# Patient Record
Sex: FEMALE | Race: WHITE | NOT HISPANIC OR LATINO | Employment: OTHER | URBAN - METROPOLITAN AREA
[De-identification: names, ages, dates, MRNs, and addresses within clinical notes are randomized per-mention and may not be internally consistent; named-entity substitution may affect disease eponyms.]

---

## 2020-01-08 ENCOUNTER — TELEPHONE (OUTPATIENT)
Dept: FAMILY MEDICINE | Facility: CLINIC | Age: 70
End: 2020-01-08

## 2020-01-08 DIAGNOSIS — Z79.899 ENCOUNTER FOR LONG-TERM (CURRENT) USE OF OTHER MEDICATIONS: ICD-10-CM

## 2020-01-08 DIAGNOSIS — Z00.00 ROUTINE GENERAL MEDICAL EXAMINATION AT A HEALTH CARE FACILITY: Primary | ICD-10-CM

## 2020-01-08 DIAGNOSIS — E78.00 PURE HYPERCHOLESTEROLEMIA: ICD-10-CM

## 2020-01-08 DIAGNOSIS — I10 BENIGN ESSENTIAL HTN: ICD-10-CM

## 2020-01-08 DIAGNOSIS — E11.9 CONTROLLED TYPE 2 DIABETES MELLITUS WITHOUT COMPLICATION, WITHOUT LONG-TERM CURRENT USE OF INSULIN: ICD-10-CM

## 2020-01-08 NOTE — TELEPHONE ENCOUNTER
Tried calling pt to let her know we are putting in labs. Neither number would get to the pt - both were unavailable and no vm box. Same number in [facility]

## 2020-01-16 DIAGNOSIS — M54.2 CERVICALGIA: Primary | ICD-10-CM

## 2020-01-20 ENCOUNTER — TELEPHONE (OUTPATIENT)
Dept: FAMILY MEDICINE | Facility: CLINIC | Age: 70
End: 2020-01-20

## 2020-01-20 NOTE — TELEPHONE ENCOUNTER
----- Message from Pao Orozco sent at 1/20/2020  9:21 AM CST -----  Contact: Cora Larry  Pt says she isn't coming in tomorrow because her 's appt was dropped for tomorrow when the systems changed. She says that her  should have been scheduled tomorrow for 3pm like it was on their appt card that she received at her last appt in July. She would like to just reschedule for February for an evening appt if possible, when her and her  can both see dr. Fiore at the same time.   Pt# [phone]

## 2020-01-22 ENCOUNTER — HOSPITAL ENCOUNTER (OUTPATIENT)
Dept: RADIOLOGY | Facility: HOSPITAL | Age: 70
Discharge: HOME | End: 2020-01-22
Attending: NEUROLOGICAL SURGERY
Payer: MEDICARE

## 2020-01-22 DIAGNOSIS — M54.2 CERVICALGIA: ICD-10-CM

## 2020-01-22 PROCEDURE — 72141 MRI NECK SPINE W/O DYE: CPT | Mod: TC,PO

## 2020-02-03 RX ORDER — ALPRAZOLAM 2 MG/1
2 TABLET ORAL DAILY
COMMUNITY
Start: 2019-11-15 | End: 2020-02-05

## 2020-02-03 RX ORDER — ZOLPIDEM TARTRATE 10 MG/1
10 TABLET ORAL NIGHTLY PRN
COMMUNITY
Start: 2019-11-14

## 2020-02-05 ENCOUNTER — OFFICE VISIT (OUTPATIENT)
Dept: FAMILY MEDICINE | Facility: CLINIC | Age: 70
End: 2020-02-05
Payer: MEDICARE

## 2020-02-05 VITALS
HEART RATE: 60 BPM | WEIGHT: 183 LBS | SYSTOLIC BLOOD PRESSURE: 126 MMHG | DIASTOLIC BLOOD PRESSURE: 78 MMHG | BODY MASS INDEX: 34.58 KG/M2

## 2020-02-05 DIAGNOSIS — J30.1 SEASONAL ALLERGIC RHINITIS DUE TO POLLEN: ICD-10-CM

## 2020-02-05 DIAGNOSIS — M47.817 LUMBOSACRAL SPONDYLOSIS WITHOUT MYELOPATHY: ICD-10-CM

## 2020-02-05 DIAGNOSIS — Z12.31 OTHER SCREENING MAMMOGRAM: ICD-10-CM

## 2020-02-05 DIAGNOSIS — K21.9 GASTROESOPHAGEAL REFLUX DISEASE WITHOUT ESOPHAGITIS: ICD-10-CM

## 2020-02-05 DIAGNOSIS — E03.9 ACQUIRED HYPOTHYROIDISM: ICD-10-CM

## 2020-02-05 DIAGNOSIS — I10 ESSENTIAL HYPERTENSION: ICD-10-CM

## 2020-02-05 DIAGNOSIS — M48.02 FORAMINAL STENOSIS OF CERVICAL REGION: Primary | ICD-10-CM

## 2020-02-05 DIAGNOSIS — E11.9 TYPE 2 DIABETES MELLITUS WITHOUT COMPLICATION, WITHOUT LONG-TERM CURRENT USE OF INSULIN: ICD-10-CM

## 2020-02-05 DIAGNOSIS — Z78.0 MENOPAUSE: ICD-10-CM

## 2020-02-05 PROCEDURE — 3078F DIAST BP <80 MM HG: CPT | Mod: S$GLB,,, | Performed by: FAMILY MEDICINE

## 2020-02-05 PROCEDURE — 1159F PR MEDICATION LIST DOCUMENTED IN MEDICAL RECORD: ICD-10-PCS | Mod: S$GLB,,, | Performed by: FAMILY MEDICINE

## 2020-02-05 PROCEDURE — 3078F PR MOST RECENT DIASTOLIC BLOOD PRESSURE < 80 MM HG: ICD-10-PCS | Mod: S$GLB,,, | Performed by: FAMILY MEDICINE

## 2020-02-05 PROCEDURE — 99214 PR OFFICE/OUTPT VISIT, EST, LEVL IV, 30-39 MIN: ICD-10-PCS | Mod: S$GLB,,, | Performed by: FAMILY MEDICINE

## 2020-02-05 PROCEDURE — 3074F PR MOST RECENT SYSTOLIC BLOOD PRESSURE < 130 MM HG: ICD-10-PCS | Mod: S$GLB,,, | Performed by: FAMILY MEDICINE

## 2020-02-05 PROCEDURE — 99214 OFFICE O/P EST MOD 30 MIN: CPT | Mod: S$GLB,,, | Performed by: FAMILY MEDICINE

## 2020-02-05 PROCEDURE — 1159F MED LIST DOCD IN RCRD: CPT | Mod: S$GLB,,, | Performed by: FAMILY MEDICINE

## 2020-02-05 PROCEDURE — 3074F SYST BP LT 130 MM HG: CPT | Mod: S$GLB,,, | Performed by: FAMILY MEDICINE

## 2020-02-05 RX ORDER — LIDOCAINE 50 MG/G
1 PATCH TOPICAL DAILY
Qty: 5 PATCH | Refills: 0 | Status: SHIPPED | OUTPATIENT
Start: 2020-02-05 | End: 2021-09-21

## 2020-02-05 NOTE — PROGRESS NOTES
SUBJECTIVE:    Patient ID: Cora Larry is a 69 y.o. female.    Chief Complaint: Follow-up (did not bring bottles. refused nv, pts list and our med list do not match up // refused prevnar )    This 69-year-old female comes in for six-month checkup.  She has had some cervical foraminal stenosis discovered on MRI.  She had left neck pain that did not respond to conservative measures and NSAIDs.  She has been going to physical therapy and has had good relief of this neck pain. She saw Dr. Tom Pedraza neurosurgeon about this.    She is due for mammogram and a DEXA scan this year.  She is willing to do a stool for blood instead of colonoscopy.      No visits with results within 6 Month(s) from this visit.   Latest known visit with results is:   Admission on 12/12/2013, Discharged on 12/12/2013   Component Date Value Ref Range Status    POCT Glucose 12/12/2013 115* 70 - 110 mg/dL Final       Past Medical History:   Diagnosis Date    Arthritis     Back pain     Depression     Diabetes mellitus     Hypercholesteremia     Seasonal allergies      Past Surgical History:   Procedure Laterality Date    CHOLECYSTECTOMY      lucero      dec 2011     History reviewed. No pertinent family history.    Marital Status:   Alcohol History:  reports that she drinks about 1.0 standard drinks of alcohol per week.  Tobacco History:  reports that she has been smoking. She has a 11.75 pack-year smoking history. She does not have any smokeless tobacco history on file.  Drug History:  has no drug history on file.    Review of patient's allergies indicates:   Allergen Reactions    Lorabid [loracarbef] Hives    Sulfa (sulfonamide antibiotics) Hives       Current Outpatient Medications:     alprazolam (XANAX) 1 MG tablet, Take 1 mg by mouth nightly as needed.  , Disp: , Rfl:     ascorbic acid (VITAMIN C) 100 MG tablet, Take 100 mg by mouth once daily.  , Disp: , Rfl:     ascorbic acid (VITAMIN C) 1000 MG tablet, Take  1,000 mg by mouth once daily.  , Disp: , Rfl:     aspirin (ECOTRIN) 81 MG EC tablet, Take 81 mg by mouth once daily.  , Disp: , Rfl:     calcium carbonate 1250 MG capsule, Take 1,250 mg by mouth 2 (two) times daily with meals.  , Disp: , Rfl:     metformin (GLUMETZA) 500 MG (MOD) 24 hr tablet, Take 500 mg by mouth daily with breakfast., Disp: , Rfl:     rosuvastatin (CRESTOR) 10 MG tablet, Take 10 mg by mouth once daily.  , Disp: , Rfl:     tramadol (ULTRAM) 50 mg tablet, Take 50 mg by mouth 2 (two) times daily., Disp: , Rfl:     zolpidem (AMBIEN) 10 mg Tab, Take 10 mg by mouth nightly as needed., Disp: , Rfl:   No current facility-administered medications for this visit.     Facility-Administered Medications Ordered in Other Visits:     betamethasone acetate-betamethasone sodium phosphate injection, , , PRN, Ruddy Serrano MD, 3 mL at 04/12/12 1002    bupivacaine (PF) 0.25 % (2.5 mg/mL) injection, , , PRN, Ruddy Serrano MD, 3 mL at 04/12/12 1002    iopamidol 61 % intrathecal injection, , , PRN, Ruddy Serrano MD, 3 mL at 04/12/12 1001    lidocaine (PF) 10 mg/mL (1 %) injection, , , PRN, Ruddy Serrano MD, 10 mL at 04/12/12 0959    Review of Systems   Constitutional: Negative for appetite change, chills, fatigue, fever and unexpected weight change.   HENT: Negative for congestion, ear pain, sinus pain, sore throat and trouble swallowing.    Eyes: Negative for pain, discharge and visual disturbance.   Respiratory: Negative for apnea, cough, shortness of breath and wheezing.    Cardiovascular: Negative for chest pain, palpitations and leg swelling.   Gastrointestinal: Negative for abdominal pain, blood in stool, constipation, diarrhea, nausea and vomiting.        Ranitidine  Helps the  gastritis   Endocrine: Negative for heat intolerance, polydipsia and polyuria.   Genitourinary: Negative for difficulty urinating, dyspareunia, dysuria, frequency, hematuria and menstrual problem.    Musculoskeletal: Positive for back pain (mild back pains, tylenol pms at  night) and neck stiffness (goes to phys therapy, tractions and  TNS unit). Negative for arthralgias, gait problem, joint swelling and myalgias.   Allergic/Immunologic: Negative for environmental allergies, food allergies and immunocompromised state.   Neurological: Negative for dizziness, tremors, seizures, numbness and headaches.   Psychiatric/Behavioral: Negative for behavioral problems, confusion, hallucinations and suicidal ideas. The patient is not nervous/anxious.           Objective:      Vitals:    02/05/20 1558   BP: 126/78   Pulse: 60   Weight: 83 kg (183 lb)     Body mass index is 34.58 kg/m².  Physical Exam   Constitutional: She is oriented to person, place, and time. She appears well-developed and well-nourished.   Mildly obese female in no apparent distress   HENT:   Head: Normocephalic and atraumatic.   Right Ear: External ear normal.   Left Ear: External ear normal.   Nose: Nose normal.   Mouth/Throat: Oropharynx is clear and moist.   Eyes: Pupils are equal, round, and reactive to light. EOM are normal.   Neck: Normal range of motion. Neck supple. Carotid bruit is not present. No thyromegaly present.   Neck rotates 80° bilaterally flexion and extension are normal.   Cardiovascular: Normal rate, regular rhythm, normal heart sounds and intact distal pulses.   No murmur heard.  Pulmonary/Chest: Effort normal and breath sounds normal. She has no wheezes. She has no rales.   Abdominal: Soft. Bowel sounds are normal. She exhibits no distension. There is no hepatosplenomegaly. There is no tenderness.   Musculoskeletal: Normal range of motion. She exhibits no tenderness or deformity.        Lumbar back: Normal. She exhibits no pain and no spasm.   Bends 90 degrees at  waist shoulders have full range of motion, knees have full range of motion.  She walks with a normal gait   Feet:   Right Foot:   Protective Sensation: 5 sites tested.  5 sites sensed.   Skin Integrity: Negative for callus.   Left Foot:   Protective Sensation: 5 sites tested. 5 sites sensed.   Skin Integrity: Negative for callus.   Lymphadenopathy:     She has no cervical adenopathy.   Neurological: She is alert and oriented to person, place, and time. No cranial nerve deficit. Coordination normal.   Skin: Skin is warm and dry. No rash noted.   Psychiatric: She has a normal mood and affect. Her behavior is normal. Judgment and thought content normal.   Nursing note and vitals reviewed.        Assessment:       1. Foraminal stenosis of cervical region    2. Essential hypertension    3. Acquired hypothyroidism    4. Type 2 diabetes mellitus without complication, without long-term current use of insulin    5. Seasonal allergic rhinitis due to pollen    6. Lumbosacral spondylosis without myelopathy    7. Gastroesophageal reflux disease without esophagitis         Plan:       Foraminal stenosis of cervical region  Continue physical therapy as needed for pain relief.  Lidocaine 5% patch daily p.r.n. pain  Essential hypertension  Blood pressure well controlled with Bystolic and amlodipine  Acquired hypothyroidism  Continue levothyroxine 50 mcg daily  Type 2 diabetes mellitus without complication, without long-term current use of insulin  A1c well controlled.  Continue metformin 500 mg daily  Seasonal allergic rhinitis due to pollen  Continue Zyrtec as needed  Lumbosacral spondylosis without myelopathy    Gastroesophageal reflux disease without esophagitis  Ranitidine 150 mg q.a.m.  She will obtain Dr. Villa recent lab work for us to place in the chart.    No follow-ups on file.

## 2020-02-09 LAB — HEMOCCULT STL QL IA: NORMAL

## 2020-02-12 ENCOUNTER — HOSPITAL ENCOUNTER (OUTPATIENT)
Dept: RADIOLOGY | Facility: HOSPITAL | Age: 70
Discharge: HOME | End: 2020-02-12
Attending: FAMILY MEDICINE
Payer: MEDICARE

## 2020-02-12 VITALS — BODY MASS INDEX: 34.55 KG/M2 | WEIGHT: 183 LBS | HEIGHT: 61 IN

## 2020-02-12 DIAGNOSIS — Z78.0 MENOPAUSE: ICD-10-CM

## 2020-02-12 DIAGNOSIS — Z12.31 OTHER SCREENING MAMMOGRAM: ICD-10-CM

## 2020-02-12 PROCEDURE — 77067 SCR MAMMO BI INCL CAD: CPT | Mod: TC,PO

## 2020-02-17 ENCOUNTER — TELEPHONE (OUTPATIENT)
Dept: FAMILY MEDICINE | Facility: CLINIC | Age: 70
End: 2020-02-17

## 2020-02-17 NOTE — TELEPHONE ENCOUNTER
----- Message from Ruddy Fiore MD sent at 2/15/2020  7:46 PM CST -----  Your stool test was negative.  No blood was seen.

## 2020-02-17 NOTE — TELEPHONE ENCOUNTER
----- Message from Ruddy Fiore MD sent at 2/15/2020  7:47 PM CST -----  Call patient.  Mammogram was normal.  Repeat mammogram in 1 year.

## 2020-09-01 ENCOUNTER — TELEPHONE (OUTPATIENT)
Dept: FAMILY MEDICINE | Facility: CLINIC | Age: 70
End: 2020-09-01

## 2020-09-17 ENCOUNTER — OFFICE VISIT (OUTPATIENT)
Dept: FAMILY MEDICINE | Facility: CLINIC | Age: 70
End: 2020-09-17
Payer: MEDICARE

## 2020-09-17 VITALS
SYSTOLIC BLOOD PRESSURE: 126 MMHG | HEIGHT: 61 IN | DIASTOLIC BLOOD PRESSURE: 64 MMHG | TEMPERATURE: 97 F | WEIGHT: 185 LBS | HEART RATE: 72 BPM | BODY MASS INDEX: 34.93 KG/M2

## 2020-09-17 DIAGNOSIS — M85.852 OSTEOPENIA OF BOTH HIPS: ICD-10-CM

## 2020-09-17 DIAGNOSIS — E11.9 TYPE 2 DIABETES MELLITUS WITHOUT COMPLICATION, WITHOUT LONG-TERM CURRENT USE OF INSULIN: Primary | ICD-10-CM

## 2020-09-17 DIAGNOSIS — H61.23 BILATERAL IMPACTED CERUMEN: ICD-10-CM

## 2020-09-17 DIAGNOSIS — Z23 NEED FOR INFLUENZA VACCINATION: ICD-10-CM

## 2020-09-17 DIAGNOSIS — M75.42 ROTATOR CUFF IMPINGEMENT SYNDROME OF LEFT SHOULDER: ICD-10-CM

## 2020-09-17 DIAGNOSIS — J30.1 SEASONAL ALLERGIC RHINITIS DUE TO POLLEN: ICD-10-CM

## 2020-09-17 DIAGNOSIS — E03.9 ACQUIRED HYPOTHYROIDISM: ICD-10-CM

## 2020-09-17 DIAGNOSIS — K21.9 GASTROESOPHAGEAL REFLUX DISEASE WITHOUT ESOPHAGITIS: ICD-10-CM

## 2020-09-17 DIAGNOSIS — Z23 NEED FOR VACCINATION AGAINST STREPTOCOCCUS PNEUMONIAE: ICD-10-CM

## 2020-09-17 DIAGNOSIS — M48.02 FORAMINAL STENOSIS OF CERVICAL REGION: ICD-10-CM

## 2020-09-17 DIAGNOSIS — M85.851 OSTEOPENIA OF BOTH HIPS: ICD-10-CM

## 2020-09-17 DIAGNOSIS — I10 ESSENTIAL HYPERTENSION: ICD-10-CM

## 2020-09-17 PROCEDURE — 1159F PR MEDICATION LIST DOCUMENTED IN MEDICAL RECORD: ICD-10-PCS | Mod: S$GLB,,, | Performed by: FAMILY MEDICINE

## 2020-09-17 PROCEDURE — 69209 REMOVE IMPACTED EAR WAX UNI: CPT | Mod: 50,S$GLB,, | Performed by: FAMILY MEDICINE

## 2020-09-17 PROCEDURE — G0008 ADMIN INFLUENZA VIRUS VAC: HCPCS | Mod: S$GLB,,, | Performed by: FAMILY MEDICINE

## 2020-09-17 PROCEDURE — 3078F DIAST BP <80 MM HG: CPT | Mod: S$GLB,,, | Performed by: FAMILY MEDICINE

## 2020-09-17 PROCEDURE — G0009 ADMIN PNEUMOCOCCAL VACCINE: HCPCS | Mod: S$GLB,,, | Performed by: FAMILY MEDICINE

## 2020-09-17 PROCEDURE — 1159F MED LIST DOCD IN RCRD: CPT | Mod: S$GLB,,, | Performed by: FAMILY MEDICINE

## 2020-09-17 PROCEDURE — G0008 FLU VACCINE - QUADRIVALENT - HIGH DOSE (65+) PRESERVATIVE FREE IM: ICD-10-PCS | Mod: S$GLB,,, | Performed by: FAMILY MEDICINE

## 2020-09-17 PROCEDURE — 99214 PR OFFICE/OUTPT VISIT, EST, LEVL IV, 30-39 MIN: ICD-10-PCS | Mod: 25,S$GLB,, | Performed by: FAMILY MEDICINE

## 2020-09-17 PROCEDURE — 99214 OFFICE O/P EST MOD 30 MIN: CPT | Mod: 25,S$GLB,, | Performed by: FAMILY MEDICINE

## 2020-09-17 PROCEDURE — 69209 EAR CERUMEN REMOVAL: ICD-10-PCS | Mod: 50,S$GLB,, | Performed by: FAMILY MEDICINE

## 2020-09-17 PROCEDURE — G0009 PNEUMOCOCCAL CONJUGATE VACCINE 13-VALENT LESS THAN 5YO & GREATER THAN: ICD-10-PCS | Mod: S$GLB,,, | Performed by: FAMILY MEDICINE

## 2020-09-17 PROCEDURE — 3074F PR MOST RECENT SYSTOLIC BLOOD PRESSURE < 130 MM HG: ICD-10-PCS | Mod: S$GLB,,, | Performed by: FAMILY MEDICINE

## 2020-09-17 PROCEDURE — 90662 FLU VACCINE - QUADRIVALENT - HIGH DOSE (65+) PRESERVATIVE FREE IM: ICD-10-PCS | Mod: S$GLB,,, | Performed by: FAMILY MEDICINE

## 2020-09-17 PROCEDURE — 90670 PCV13 VACCINE IM: CPT | Mod: S$GLB,,, | Performed by: FAMILY MEDICINE

## 2020-09-17 PROCEDURE — 3008F BODY MASS INDEX DOCD: CPT | Mod: S$GLB,,, | Performed by: FAMILY MEDICINE

## 2020-09-17 PROCEDURE — 90662 IIV NO PRSV INCREASED AG IM: CPT | Mod: S$GLB,,, | Performed by: FAMILY MEDICINE

## 2020-09-17 PROCEDURE — 3078F PR MOST RECENT DIASTOLIC BLOOD PRESSURE < 80 MM HG: ICD-10-PCS | Mod: S$GLB,,, | Performed by: FAMILY MEDICINE

## 2020-09-17 PROCEDURE — 90670 PNEUMOCOCCAL CONJUGATE VACCINE 13-VALENT LESS THAN 5YO & GREATER THAN: ICD-10-PCS | Mod: S$GLB,,, | Performed by: FAMILY MEDICINE

## 2020-09-17 PROCEDURE — 3074F SYST BP LT 130 MM HG: CPT | Mod: S$GLB,,, | Performed by: FAMILY MEDICINE

## 2020-09-17 PROCEDURE — 3008F PR BODY MASS INDEX (BMI) DOCUMENTED: ICD-10-PCS | Mod: S$GLB,,, | Performed by: FAMILY MEDICINE

## 2020-09-17 RX ORDER — METFORMIN HYDROCHLORIDE 500 MG/1
1000 TABLET ORAL 2 TIMES DAILY WITH MEALS
COMMUNITY
Start: 2020-07-17

## 2020-09-17 RX ORDER — NEBIVOLOL HYDROCHLORIDE 5 MG/1
1 TABLET ORAL DAILY
COMMUNITY
Start: 2020-09-14

## 2020-09-17 RX ORDER — AMLODIPINE BESYLATE 5 MG/1
1 TABLET ORAL DAILY
COMMUNITY
Start: 2020-09-14

## 2020-09-17 RX ORDER — CANAGLIFLOZIN 100 MG/1
100 TABLET, FILM COATED ORAL DAILY
Qty: 30 TABLET | Refills: 2
Start: 2020-09-17 | End: 2021-09-21

## 2020-09-17 RX ORDER — LEVOTHYROXINE SODIUM 50 UG/1
1 TABLET ORAL DAILY
COMMUNITY
Start: 2020-08-03 | End: 2022-03-24

## 2020-09-17 NOTE — PROCEDURES
"Ear Cerumen Removal    Date/Time: 9/17/2020 2:40 PM  Performed by: Ruddy Fiore MD  Authorized by: Ruddy Fiore MD     Time out: Immediately prior to procedure a "time out" was called to verify the correct patient, procedure, equipment, support staff and site/side marked as required.    Consent Done?:  Yes (Verbal)    Local anesthetic:  None  Location details:  Both ears  Procedure type: irrigation    Patient tolerance:  Patient tolerated the procedure well with no immediate complications      "

## 2020-09-17 NOTE — PROGRESS NOTES
Subjective:       Patient ID: Cora Larry is a 69 y.o. female.    Chief Complaint: Follow-up (did not bring bottles, brought a list // dexa - due // agrees to flu and pna ac)    69 year old female arrives today for 6 month follow up.  She has been having left shoulder pain since the start of the pandemic.  She pain is worse in the morning when she sleeps on that shoulder.  She also is having ear fullness and feels as though her ears are full of wax.  She is currently taking zyrtec daily for her allergies and it is working fine.  She wakes up with some congestion and sneezing.      She has been trouble with her GERD.  She takes her Prilosec daily which is working well and she takes the Tums as needed.     Patient exclaims that she has been eating more since the pandemic.  She has been eating more sweets.     She previously had neck pain which was resolved with physical therapy.     Plans to receive influenza and pneumococcal vaccine in clinic today.     Review of patient's allergies indicates:   Allergen Reactions    Lorabid [loracarbef] Hives    Sulfa (sulfonamide antibiotics) Hives         Current Outpatient Medications:     alprazolam (XANAX) 1 MG tablet, Take 1 mg by mouth nightly as needed.  , Disp: , Rfl:     amLODIPine (NORVASC) 5 MG tablet, Take 1 tablet by mouth once daily., Disp: , Rfl:     ascorbic acid (VITAMIN C) 100 MG tablet, Take 100 mg by mouth once daily.  , Disp: , Rfl:     ascorbic acid (VITAMIN C) 1000 MG tablet, Take 1,000 mg by mouth once daily.  , Disp: , Rfl:     aspirin (ECOTRIN) 81 MG EC tablet, Take 81 mg by mouth once daily.  , Disp: , Rfl:     BYSTOLIC 5 mg Tab, Take 1 tablet by mouth once daily., Disp: , Rfl:     calcium carbonate 1250 MG capsule, Take 1,250 mg by mouth 2 (two) times daily with meals.  , Disp: , Rfl:     canagliflozin (INVOKANA) 100 mg Tab tablet, Take 1 tablet (100 mg total) by mouth once daily., Disp: 30 tablet, Rfl: 2    lidocaine (LIDODERM) 5 %,  Place 1 patch onto the skin once daily. Remove & Discard patch within 12 hours or as directed by MD, Disp: 5 patch, Rfl: 0    metFORMIN (GLUCOPHAGE) 500 MG tablet, , Disp: , Rfl:     ranitidine (ZANTAC) 150 MG tablet, Take 1 tablet (150 mg total) by mouth once daily., Disp: 90 tablet, Rfl: 1    rosuvastatin (CRESTOR) 10 MG tablet, Take 10 mg by mouth once daily.  , Disp: , Rfl:     SYNTHROID 50 mcg tablet, Take 1 tablet by mouth once daily., Disp: , Rfl:     tramadol (ULTRAM) 50 mg tablet, Take 50 mg by mouth 2 (two) times daily., Disp: , Rfl:     zolpidem (AMBIEN) 10 mg Tab, Take 10 mg by mouth nightly as needed., Disp: , Rfl:   No current facility-administered medications for this visit.     Facility-Administered Medications Ordered in Other Visits:     betamethasone acetate-betamethasone sodium phosphate injection, , , PRN, Ruddy Serrano MD, 3 mL at 04/12/12 1002    bupivacaine (PF) 0.25 % (2.5 mg/mL) injection, , , PRN, Ruddy Serrano MD, 3 mL at 04/12/12 1002    iopamidol 61 % intrathecal injection, , , PRN, Ruddy Serrano MD, 3 mL at 04/12/12 1001    lidocaine (PF) 10 mg/mL (1 %) injection, , , PRN, Ruddy Serrano MD, 10 mL at 04/12/12 0959    No results found for: WBC, HGB, HCT, PLT, CHOL, TRIG, HDL, LDLDIRECT, ALT, AST, NA, K, CL, CREATININE, BUN, CO2, TSH, PSA, INR, GLUF, HGBA1C, MICROALBUR    Review of Systems   Constitutional: Positive for appetite change. Negative for activity change, fatigue and fever.   HENT: Positive for congestion (morning). Negative for ear discharge, ear pain, postnasal drip and sinus pain.    Eyes: Negative for pain, discharge and itching.   Respiratory: Positive for cough (night). Negative for chest tightness and shortness of breath.    Cardiovascular: Negative for chest pain, palpitations and leg swelling.   Gastrointestinal: Negative for abdominal pain, blood in stool, constipation, diarrhea, nausea and vomiting.   Endocrine: Negative for cold  intolerance and heat intolerance.   Genitourinary: Negative for difficulty urinating, dysuria, hematuria and urgency.        Nocturia(1x per night)     Musculoskeletal: Positive for arthralgias (lt shoulder). Negative for back pain, neck pain and neck stiffness.   Skin: Negative for color change, pallor, rash and wound.   Neurological: Negative for dizziness, weakness, light-headedness and numbness.   Hematological: Does not bruise/bleed easily.   Psychiatric/Behavioral: Negative for confusion and sleep disturbance.       Objective:      Physical Exam  Constitutional:       General: She is not in acute distress.     Appearance: Normal appearance.   HENT:      Head: Normocephalic and atraumatic.      Right Ear: There is impacted cerumen.      Left Ear: There is impacted cerumen.   Eyes:      Pupils: Pupils are equal, round, and reactive to light.   Neck:      Musculoskeletal: Normal range of motion and neck supple. No neck rigidity or muscular tenderness.   Cardiovascular:      Rate and Rhythm: Normal rate and regular rhythm.      Pulses: Normal pulses.   Pulmonary:      Effort: Pulmonary effort is normal. No respiratory distress.      Breath sounds: Normal breath sounds. No wheezing.   Abdominal:      General: Abdomen is flat. There is no distension.      Palpations: Abdomen is soft.      Tenderness: There is no abdominal tenderness.   Musculoskeletal: Normal range of motion.         General: No swelling or tenderness.      Right lower leg: No edema.      Left lower leg: No edema.      Comments: Lt shoulder pain; pain with reaching back   Skin:     General: Skin is warm and dry.      Coloration: Skin is not jaundiced or pale.   Neurological:      General: No focal deficit present.      Mental Status: She is alert and oriented to person, place, and time.      Cranial Nerves: No cranial nerve deficit.   Psychiatric:         Mood and Affect: Mood normal.         Behavior: Behavior normal.         Thought Content:  Thought content normal.         Judgment: Judgment normal.         Assessment:       1. Type 2 diabetes mellitus without complication, without long-term current use of insulin    2. Need for vaccination against Streptococcus pneumoniae    3. Need for influenza vaccination    4. Bilateral impacted cerumen    5. Essential hypertension    6. Rotator cuff impingement syndrome of left shoulder    7. Acquired hypothyroidism    8. Gastroesophageal reflux disease without esophagitis    9. Foraminal stenosis of cervical region    10. Seasonal allergic rhinitis due to pollen    11. Osteopenia of both hips        Plan:       1.  Diabetes type 2-slightly worse control but A1c of 7.0 not out of control.  She agrees to cut back on her sweets chocolates and chips, add Invokana 100 mg daily  2.  Flu and Prevnar 13 vaccine today  4.  Cerumen impaction-irrigate bilateral ears      Five hypertension-stable on current meds, followed by Dr. Villa  6.  Left shoulder rotator cuff impingement-shoulder exercises  and and ice packs.   may need orthopedic shoulder injection with cortisone  7.  Hypothyroidism-TSH normal  Eight GERD-omeprazole working well  9.  Foraminal stenosis cervical region-pain has resolved with physical therapy

## 2020-10-01 ENCOUNTER — OFFICE VISIT (OUTPATIENT)
Dept: ORTHOPEDICS | Facility: CLINIC | Age: 70
End: 2020-10-01
Payer: MEDICARE

## 2020-10-01 VITALS
SYSTOLIC BLOOD PRESSURE: 130 MMHG | WEIGHT: 180 LBS | BODY MASS INDEX: 33.99 KG/M2 | DIASTOLIC BLOOD PRESSURE: 78 MMHG | HEART RATE: 80 BPM | HEIGHT: 61 IN

## 2020-10-01 DIAGNOSIS — M75.42 IMPINGEMENT SYNDROME OF SHOULDER, LEFT: Primary | ICD-10-CM

## 2020-10-01 PROCEDURE — 20610 LARGE JOINT ASPIRATION/INJECTION: L SUBACROMIAL BURSA: ICD-10-PCS | Mod: LT,S$GLB,, | Performed by: ORTHOPAEDIC SURGERY

## 2020-10-01 PROCEDURE — 1125F AMNT PAIN NOTED PAIN PRSNT: CPT | Mod: S$GLB,,, | Performed by: ORTHOPAEDIC SURGERY

## 2020-10-01 PROCEDURE — 3078F DIAST BP <80 MM HG: CPT | Mod: S$GLB,,, | Performed by: ORTHOPAEDIC SURGERY

## 2020-10-01 PROCEDURE — 3008F PR BODY MASS INDEX (BMI) DOCUMENTED: ICD-10-PCS | Mod: S$GLB,,, | Performed by: ORTHOPAEDIC SURGERY

## 2020-10-01 PROCEDURE — 3078F PR MOST RECENT DIASTOLIC BLOOD PRESSURE < 80 MM HG: ICD-10-PCS | Mod: S$GLB,,, | Performed by: ORTHOPAEDIC SURGERY

## 2020-10-01 PROCEDURE — 1101F PT FALLS ASSESS-DOCD LE1/YR: CPT | Mod: S$GLB,,, | Performed by: ORTHOPAEDIC SURGERY

## 2020-10-01 PROCEDURE — 99204 PR OFFICE/OUTPT VISIT, NEW, LEVL IV, 45-59 MIN: ICD-10-PCS | Mod: 25,S$GLB,, | Performed by: ORTHOPAEDIC SURGERY

## 2020-10-01 PROCEDURE — 20610 DRAIN/INJ JOINT/BURSA W/O US: CPT | Mod: LT,S$GLB,, | Performed by: ORTHOPAEDIC SURGERY

## 2020-10-01 PROCEDURE — 1125F PR PAIN SEVERITY QUANTIFIED, PAIN PRESENT: ICD-10-PCS | Mod: S$GLB,,, | Performed by: ORTHOPAEDIC SURGERY

## 2020-10-01 PROCEDURE — 3075F PR MOST RECENT SYSTOLIC BLOOD PRESS GE 130-139MM HG: ICD-10-PCS | Mod: S$GLB,,, | Performed by: ORTHOPAEDIC SURGERY

## 2020-10-01 PROCEDURE — 3075F SYST BP GE 130 - 139MM HG: CPT | Mod: S$GLB,,, | Performed by: ORTHOPAEDIC SURGERY

## 2020-10-01 PROCEDURE — 1159F MED LIST DOCD IN RCRD: CPT | Mod: S$GLB,,, | Performed by: ORTHOPAEDIC SURGERY

## 2020-10-01 PROCEDURE — 1101F PR PT FALLS ASSESS DOC 0-1 FALLS W/OUT INJ PAST YR: ICD-10-PCS | Mod: S$GLB,,, | Performed by: ORTHOPAEDIC SURGERY

## 2020-10-01 PROCEDURE — 1159F PR MEDICATION LIST DOCUMENTED IN MEDICAL RECORD: ICD-10-PCS | Mod: S$GLB,,, | Performed by: ORTHOPAEDIC SURGERY

## 2020-10-01 PROCEDURE — 99204 OFFICE O/P NEW MOD 45 MIN: CPT | Mod: 25,S$GLB,, | Performed by: ORTHOPAEDIC SURGERY

## 2020-10-01 PROCEDURE — 3008F BODY MASS INDEX DOCD: CPT | Mod: S$GLB,,, | Performed by: ORTHOPAEDIC SURGERY

## 2020-10-01 RX ORDER — METHYLPREDNISOLONE ACETATE 40 MG/ML
40 INJECTION, SUSPENSION INTRA-ARTICULAR; INTRALESIONAL; INTRAMUSCULAR; SOFT TISSUE
Status: DISCONTINUED | OUTPATIENT
Start: 2020-10-01 | End: 2020-10-01 | Stop reason: HOSPADM

## 2020-10-01 RX ORDER — DAPAGLIFLOZIN 10 MG/1
TABLET, FILM COATED ORAL
COMMUNITY
Start: 2020-09-18 | End: 2021-04-05 | Stop reason: SDUPTHER

## 2020-10-01 RX ADMIN — METHYLPREDNISOLONE ACETATE 40 MG: 40 INJECTION, SUSPENSION INTRA-ARTICULAR; INTRALESIONAL; INTRAMUSCULAR; SOFT TISSUE at 11:10

## 2020-10-01 NOTE — PROGRESS NOTES
[facility]    Subjective:     Chief Complaint:   Chief Complaint   Patient presents with    Left Shoulder - Pain     Left shoulder pain x 2 months, States that her pain is not constant, that it comes and goes with certain movements such as raising her hand above her head and behind her back        Past Medical History:   Diagnosis Date    Arthritis     Back pain     Depression     Diabetes mellitus     Hypercholesteremia     Seasonal allergies        Past Surgical History:   Procedure Laterality Date    CHOLECYSTECTOMY      lucero      dec 2011       Current Outpatient Medications   Medication Sig    alprazolam (XANAX) 1 MG tablet Take 1 mg by mouth nightly as needed.      amLODIPine (NORVASC) 5 MG tablet Take 1 tablet by mouth once daily.    aspirin (ECOTRIN) 81 MG EC tablet Take 81 mg by mouth once daily.      BYSTOLIC 5 mg Tab Take 1 tablet by mouth once daily.    canagliflozin (INVOKANA) 100 mg Tab tablet Take 1 tablet (100 mg total) by mouth once daily.    lidocaine (LIDODERM) 5 % Place 1 patch onto the skin once daily. Remove & Discard patch within 12 hours or as directed by MD    metFORMIN (GLUCOPHAGE) 500 MG tablet     rosuvastatin (CRESTOR) 10 MG tablet Take 10 mg by mouth once daily.      SYNTHROID 50 mcg tablet Take 1 tablet by mouth once daily.    zolpidem (AMBIEN) 10 mg Tab Take 10 mg by mouth nightly as needed.    FARXIGA 10 mg tablet      No current facility-administered medications for this visit.      Facility-Administered Medications Ordered in Other Visits   Medication    betamethasone acetate-betamethasone sodium phosphate injection    bupivacaine (PF) 0.25 % (2.5 mg/mL) injection    iopamidol 61 % intrathecal injection    lidocaine (PF) 10 mg/mL (1 %) injection       Review of patient's allergies indicates:   Allergen Reactions    Lorabid [loracarbef] Hives    Sulfa (sulfonamide antibiotics) Hives       Family History   Problem Relation Age of Onset    Breast  cancer Sister     Breast cancer Maternal Aunt        Social History     Socioeconomic History    Marital status:      Spouse name: Not on file    Number of children: Not on file    Years of education: Not on file    Highest education level: Not on file   Occupational History    Not on file   Social Needs    Financial resource strain: Not on file    Food insecurity     Worry: Not on file     Inability: Not on file    Transportation needs     Medical: Not on file     Non-medical: Not on file   Tobacco Use    Smoking status: Current Every Day Smoker     Packs/day: 0.25     Years: 47.00     Pack years: 11.75   Substance and Sexual Activity    Alcohol use: Yes     Alcohol/week: 1.0 standard drinks     Types: 1 Glasses of wine per week     Comment: 2 times per month    Drug use: Not on file    Sexual activity: Not on file   Lifestyle    Physical activity     Days per week: Not on file     Minutes per session: Not on file    Stress: Not on file   Relationships    Social connections     Talks on phone: Not on file     Gets together: Not on file     Attends Mandaen service: Not on file     Active member of club or organization: Not on file     Attends meetings of clubs or organizations: Not on file     Relationship status: Not on file   Other Topics Concern    Not on file   Social History Narrative    Not on file       History of present illness:  Patient comes in today for the left shoulder.  She has had shoulder pain for about 3 months.  She is not sure what started.  Severe at times.  Difficulty with overhead activity.  Difficulty sleeping at night.      Review of Systems:    Constitution: Negative for chills, fever, and sweats.  Negative for unexplained weight loss.    HENT:  Negative for headaches and blurry vision.    Cardiovascular:Negative for chest pain or irregular heart beat. Negative for hypertension.    Respiratory:  Negative for cough and shortness of breath.    Gastrointestinal:  Negative for abdominal pain, heartburn, melena, nausea, and vomitting.    Genitourinary:  Negative bladder incontinence and dysuria.    Musculoskeletal:  See HPI for details.     Neurological: Negative for numbness.    Psychiatric/Behavioral: Negative for depression.  The patient is not nervous/anxious.      Endocrine: Negative for polyuria    Hematologic/Lymphatic: Negative for bleeding problem.  Does not bruise/bleed easily.    Skin: Negative for poor would healing and rash    Objective:      Physical Examination:    Vital Signs:    Vitals:    10/01/20 1055   BP: 130/78   Pulse: 80       Body mass index is 34.01 kg/m².    This a well-developed, well nourished patient in no acute distress.  They are alert and oriented and cooperative to examination.        Patient has full range of motion of the left shoulder.  Positive impingement.  Positive crossover.  The rotator cuff is intact to resisted testing.  Spurling sign is negative.  Full range of motion of the cervical spine.  Full range of motion of the contralateral side.  Pertinent New Results:    XRAY Report / Interpretation:   AP and lateral left shoulder demonstrates a type 2 acromion.  Mild acromioclavicular arthrosis.  No fractures no glenohumeral arthrosis    Assessment/Plan:      Impingement syndrome left shoulder.  I injected the subacromial space with Depo-Medrol and lidocaine.  Follow-up 6 weeks.  I did start her on physical therapy.      This note was created using Dragon voice recognition software that occasionally misinterpreted phrases or words.

## 2020-10-01 NOTE — PROCEDURES
Large Joint Aspiration/Injection: L subacromial bursa    Date/Time: 10/1/2020 11:15 AM  Performed by: Mina Jaems MD  Authorized by: Mina James MD     Consent Done?:  Yes (Verbal)  Indications:  Pain  Site marked: the procedure site was marked    Timeout: prior to procedure the correct patient, procedure, and site was verified    Prep: patient was prepped and draped in usual sterile fashion      Local anesthesia used?: Yes    Local anesthetic:  Lidocaine 1% without epinephrine  Ultrasonic Guidance for needle placement?: No    Location:  Shoulder  Site:  L subacromial bursa  Medications:  40 mg methylPREDNISolone acetate 40 mg/mL  Patient tolerance:  Patient tolerated the procedure well with no immediate complications

## 2021-01-22 ENCOUNTER — PATIENT MESSAGE (OUTPATIENT)
Dept: ADMINISTRATIVE | Facility: OTHER | Age: 71
End: 2021-01-22

## 2021-02-04 ENCOUNTER — TELEPHONE (OUTPATIENT)
Dept: FAMILY MEDICINE | Facility: CLINIC | Age: 71
End: 2021-02-04

## 2021-02-04 DIAGNOSIS — M85.852 OSTEOPENIA OF BOTH HIPS: Primary | ICD-10-CM

## 2021-02-04 DIAGNOSIS — M85.851 OSTEOPENIA OF BOTH HIPS: Primary | ICD-10-CM

## 2021-02-04 DIAGNOSIS — Z78.0 MENOPAUSE: ICD-10-CM

## 2021-03-05 ENCOUNTER — TELEPHONE (OUTPATIENT)
Dept: FAMILY MEDICINE | Facility: CLINIC | Age: 71
End: 2021-03-05

## 2021-03-05 DIAGNOSIS — I10 ESSENTIAL HYPERTENSION: ICD-10-CM

## 2021-03-05 DIAGNOSIS — Z00.00 ROUTINE GENERAL MEDICAL EXAMINATION AT A HEALTH CARE FACILITY: Primary | ICD-10-CM

## 2021-03-05 DIAGNOSIS — Z79.899 ENCOUNTER FOR LONG-TERM (CURRENT) USE OF OTHER MEDICATIONS: ICD-10-CM

## 2021-03-05 DIAGNOSIS — Z12.31 ENCOUNTER FOR SCREENING MAMMOGRAM FOR MALIGNANT NEOPLASM OF BREAST: Primary | ICD-10-CM

## 2021-03-05 DIAGNOSIS — E03.9 ACQUIRED HYPOTHYROIDISM: ICD-10-CM

## 2021-03-05 DIAGNOSIS — E11.9 TYPE 2 DIABETES MELLITUS WITHOUT COMPLICATION, WITHOUT LONG-TERM CURRENT USE OF INSULIN: ICD-10-CM

## 2021-03-22 ENCOUNTER — OFFICE VISIT (OUTPATIENT)
Dept: FAMILY MEDICINE | Facility: CLINIC | Age: 71
End: 2021-03-22
Payer: MEDICARE

## 2021-03-22 VITALS
BODY MASS INDEX: 33.99 KG/M2 | HEART RATE: 64 BPM | DIASTOLIC BLOOD PRESSURE: 82 MMHG | SYSTOLIC BLOOD PRESSURE: 128 MMHG | HEIGHT: 61 IN | WEIGHT: 180 LBS

## 2021-03-22 DIAGNOSIS — M85.852 OSTEOPENIA OF BOTH HIPS: ICD-10-CM

## 2021-03-22 DIAGNOSIS — I10 ESSENTIAL HYPERTENSION: ICD-10-CM

## 2021-03-22 DIAGNOSIS — M75.42 ROTATOR CUFF IMPINGEMENT SYNDROME OF LEFT SHOULDER: ICD-10-CM

## 2021-03-22 DIAGNOSIS — M47.817 LUMBOSACRAL SPONDYLOSIS WITHOUT MYELOPATHY: ICD-10-CM

## 2021-03-22 DIAGNOSIS — M48.02 FORAMINAL STENOSIS OF CERVICAL REGION: ICD-10-CM

## 2021-03-22 DIAGNOSIS — M85.851 OSTEOPENIA OF BOTH HIPS: ICD-10-CM

## 2021-03-22 DIAGNOSIS — K21.9 GASTROESOPHAGEAL REFLUX DISEASE WITHOUT ESOPHAGITIS: ICD-10-CM

## 2021-03-22 DIAGNOSIS — E11.9 TYPE 2 DIABETES MELLITUS WITHOUT COMPLICATION, WITHOUT LONG-TERM CURRENT USE OF INSULIN: Primary | ICD-10-CM

## 2021-03-22 DIAGNOSIS — J30.1 SEASONAL ALLERGIC RHINITIS DUE TO POLLEN: ICD-10-CM

## 2021-03-22 DIAGNOSIS — Z12.11 SPECIAL SCREENING FOR MALIGNANT NEOPLASMS, COLON: ICD-10-CM

## 2021-03-22 DIAGNOSIS — E03.9 ACQUIRED HYPOTHYROIDISM: ICD-10-CM

## 2021-03-22 PROCEDURE — 1159F MED LIST DOCD IN RCRD: CPT | Mod: S$GLB,,, | Performed by: FAMILY MEDICINE

## 2021-03-22 PROCEDURE — 3074F PR MOST RECENT SYSTOLIC BLOOD PRESSURE < 130 MM HG: ICD-10-PCS | Mod: S$GLB,,, | Performed by: FAMILY MEDICINE

## 2021-03-22 PROCEDURE — 99214 PR OFFICE/OUTPT VISIT, EST, LEVL IV, 30-39 MIN: ICD-10-PCS | Mod: S$GLB,,, | Performed by: FAMILY MEDICINE

## 2021-03-22 PROCEDURE — 3074F SYST BP LT 130 MM HG: CPT | Mod: S$GLB,,, | Performed by: FAMILY MEDICINE

## 2021-03-22 PROCEDURE — 99214 OFFICE O/P EST MOD 30 MIN: CPT | Mod: S$GLB,,, | Performed by: FAMILY MEDICINE

## 2021-03-22 PROCEDURE — 3008F PR BODY MASS INDEX (BMI) DOCUMENTED: ICD-10-PCS | Mod: S$GLB,,, | Performed by: FAMILY MEDICINE

## 2021-03-22 PROCEDURE — 3079F PR MOST RECENT DIASTOLIC BLOOD PRESSURE 80-89 MM HG: ICD-10-PCS | Mod: S$GLB,,, | Performed by: FAMILY MEDICINE

## 2021-03-22 PROCEDURE — 3079F DIAST BP 80-89 MM HG: CPT | Mod: S$GLB,,, | Performed by: FAMILY MEDICINE

## 2021-03-22 PROCEDURE — 3008F BODY MASS INDEX DOCD: CPT | Mod: S$GLB,,, | Performed by: FAMILY MEDICINE

## 2021-03-22 PROCEDURE — 1159F PR MEDICATION LIST DOCUMENTED IN MEDICAL RECORD: ICD-10-PCS | Mod: S$GLB,,, | Performed by: FAMILY MEDICINE

## 2021-03-23 ENCOUNTER — HOSPITAL ENCOUNTER (OUTPATIENT)
Dept: RADIOLOGY | Facility: HOSPITAL | Age: 71
Discharge: HOME | End: 2021-03-23
Attending: FAMILY MEDICINE
Payer: MEDICARE

## 2021-03-23 VITALS — WEIGHT: 179.88 LBS | BODY MASS INDEX: 33.96 KG/M2 | HEIGHT: 61 IN

## 2021-03-23 DIAGNOSIS — M85.851 OSTEOPENIA OF BOTH HIPS: ICD-10-CM

## 2021-03-23 DIAGNOSIS — Z12.31 ENCOUNTER FOR SCREENING MAMMOGRAM FOR MALIGNANT NEOPLASM OF BREAST: ICD-10-CM

## 2021-03-23 DIAGNOSIS — Z78.0 MENOPAUSE: ICD-10-CM

## 2021-03-23 DIAGNOSIS — M85.852 OSTEOPENIA OF BOTH HIPS: ICD-10-CM

## 2021-03-23 PROCEDURE — 77067 SCR MAMMO BI INCL CAD: CPT | Mod: TC,PO

## 2021-03-23 PROCEDURE — 77080 DXA BONE DENSITY AXIAL: CPT | Mod: TC,PO

## 2021-03-29 ENCOUNTER — TELEPHONE (OUTPATIENT)
Dept: FAMILY MEDICINE | Facility: CLINIC | Age: 71
End: 2021-03-29

## 2021-03-31 LAB — HEMOCCULT STL QL IA: NORMAL

## 2021-04-05 RX ORDER — DAPAGLIFLOZIN 10 MG/1
10 TABLET, FILM COATED ORAL DAILY
Qty: 30 TABLET | Refills: 0 | Status: SHIPPED | OUTPATIENT
Start: 2021-04-05 | End: 2023-10-02

## 2021-04-14 ENCOUNTER — TELEPHONE (OUTPATIENT)
Dept: FAMILY MEDICINE | Facility: CLINIC | Age: 71
End: 2021-04-14

## 2021-05-07 ENCOUNTER — OFFICE VISIT (OUTPATIENT)
Dept: FAMILY MEDICINE | Facility: CLINIC | Age: 71
End: 2021-05-07
Payer: MEDICARE

## 2021-05-07 VITALS
SYSTOLIC BLOOD PRESSURE: 130 MMHG | WEIGHT: 181 LBS | HEART RATE: 80 BPM | DIASTOLIC BLOOD PRESSURE: 88 MMHG | HEIGHT: 61 IN | BODY MASS INDEX: 34.17 KG/M2

## 2021-05-07 DIAGNOSIS — Z01.818 PRE-OPERATIVE CLEARANCE: ICD-10-CM

## 2021-05-07 DIAGNOSIS — I10 ESSENTIAL HYPERTENSION: ICD-10-CM

## 2021-05-07 DIAGNOSIS — E11.9 TYPE 2 DIABETES MELLITUS WITHOUT COMPLICATION, WITHOUT LONG-TERM CURRENT USE OF INSULIN: Primary | ICD-10-CM

## 2021-05-07 PROCEDURE — 3075F PR MOST RECENT SYSTOLIC BLOOD PRESS GE 130-139MM HG: ICD-10-PCS | Mod: S$GLB,,, | Performed by: NURSE PRACTITIONER

## 2021-05-07 PROCEDURE — 99212 PR OFFICE/OUTPT VISIT, EST, LEVL II, 10-19 MIN: ICD-10-PCS | Mod: S$GLB,,, | Performed by: NURSE PRACTITIONER

## 2021-05-07 PROCEDURE — 1159F PR MEDICATION LIST DOCUMENTED IN MEDICAL RECORD: ICD-10-PCS | Mod: S$GLB,,, | Performed by: NURSE PRACTITIONER

## 2021-05-07 PROCEDURE — 1159F MED LIST DOCD IN RCRD: CPT | Mod: S$GLB,,, | Performed by: NURSE PRACTITIONER

## 2021-05-07 PROCEDURE — 3075F SYST BP GE 130 - 139MM HG: CPT | Mod: S$GLB,,, | Performed by: NURSE PRACTITIONER

## 2021-05-07 PROCEDURE — 3008F PR BODY MASS INDEX (BMI) DOCUMENTED: ICD-10-PCS | Mod: S$GLB,,, | Performed by: NURSE PRACTITIONER

## 2021-05-07 PROCEDURE — 3008F BODY MASS INDEX DOCD: CPT | Mod: S$GLB,,, | Performed by: NURSE PRACTITIONER

## 2021-05-07 PROCEDURE — 99212 OFFICE O/P EST SF 10 MIN: CPT | Mod: S$GLB,,, | Performed by: NURSE PRACTITIONER

## 2021-05-07 PROCEDURE — 3079F DIAST BP 80-89 MM HG: CPT | Mod: S$GLB,,, | Performed by: NURSE PRACTITIONER

## 2021-05-07 PROCEDURE — 3079F PR MOST RECENT DIASTOLIC BLOOD PRESSURE 80-89 MM HG: ICD-10-PCS | Mod: S$GLB,,, | Performed by: NURSE PRACTITIONER

## 2021-09-21 ENCOUNTER — OFFICE VISIT (OUTPATIENT)
Dept: FAMILY MEDICINE | Facility: CLINIC | Age: 71
End: 2021-09-21
Payer: MEDICARE

## 2021-09-21 VITALS
WEIGHT: 179 LBS | BODY MASS INDEX: 33.79 KG/M2 | SYSTOLIC BLOOD PRESSURE: 128 MMHG | DIASTOLIC BLOOD PRESSURE: 70 MMHG | HEART RATE: 60 BPM | HEIGHT: 61 IN

## 2021-09-21 DIAGNOSIS — M47.817 LUMBOSACRAL SPONDYLOSIS WITHOUT MYELOPATHY: ICD-10-CM

## 2021-09-21 DIAGNOSIS — M48.02 FORAMINAL STENOSIS OF CERVICAL REGION: ICD-10-CM

## 2021-09-21 DIAGNOSIS — Z23 NEED FOR INFLUENZA VACCINATION: ICD-10-CM

## 2021-09-21 DIAGNOSIS — E11.9 TYPE 2 DIABETES MELLITUS WITHOUT COMPLICATION, WITHOUT LONG-TERM CURRENT USE OF INSULIN: Primary | ICD-10-CM

## 2021-09-21 DIAGNOSIS — J30.1 SEASONAL ALLERGIC RHINITIS DUE TO POLLEN: ICD-10-CM

## 2021-09-21 DIAGNOSIS — M85.852 OSTEOPENIA OF BOTH HIPS: ICD-10-CM

## 2021-09-21 DIAGNOSIS — E03.9 ACQUIRED HYPOTHYROIDISM: ICD-10-CM

## 2021-09-21 DIAGNOSIS — I10 ESSENTIAL HYPERTENSION: ICD-10-CM

## 2021-09-21 DIAGNOSIS — K21.9 GASTROESOPHAGEAL REFLUX DISEASE WITHOUT ESOPHAGITIS: ICD-10-CM

## 2021-09-21 DIAGNOSIS — M85.851 OSTEOPENIA OF BOTH HIPS: ICD-10-CM

## 2021-09-21 PROCEDURE — 1159F PR MEDICATION LIST DOCUMENTED IN MEDICAL RECORD: ICD-10-PCS | Mod: S$GLB,,, | Performed by: FAMILY MEDICINE

## 2021-09-21 PROCEDURE — 99214 OFFICE O/P EST MOD 30 MIN: CPT | Mod: 25,S$GLB,, | Performed by: FAMILY MEDICINE

## 2021-09-21 PROCEDURE — 3008F BODY MASS INDEX DOCD: CPT | Mod: S$GLB,,, | Performed by: FAMILY MEDICINE

## 2021-09-21 PROCEDURE — 90662 IIV NO PRSV INCREASED AG IM: CPT | Mod: S$GLB,,, | Performed by: FAMILY MEDICINE

## 2021-09-21 PROCEDURE — 1160F PR REVIEW ALL MEDS BY PRESCRIBER/CLIN PHARMACIST DOCUMENTED: ICD-10-PCS | Mod: S$GLB,,, | Performed by: FAMILY MEDICINE

## 2021-09-21 PROCEDURE — 3074F SYST BP LT 130 MM HG: CPT | Mod: S$GLB,,, | Performed by: FAMILY MEDICINE

## 2021-09-21 PROCEDURE — 3078F DIAST BP <80 MM HG: CPT | Mod: S$GLB,,, | Performed by: FAMILY MEDICINE

## 2021-09-21 PROCEDURE — 3074F PR MOST RECENT SYSTOLIC BLOOD PRESSURE < 130 MM HG: ICD-10-PCS | Mod: S$GLB,,, | Performed by: FAMILY MEDICINE

## 2021-09-21 PROCEDURE — 99214 PR OFFICE/OUTPT VISIT, EST, LEVL IV, 30-39 MIN: ICD-10-PCS | Mod: 25,S$GLB,, | Performed by: FAMILY MEDICINE

## 2021-09-21 PROCEDURE — G0008 ADMIN INFLUENZA VIRUS VAC: HCPCS | Mod: S$GLB,,, | Performed by: FAMILY MEDICINE

## 2021-09-21 PROCEDURE — 90662 FLU VACCINE - QUADRIVALENT - HIGH DOSE (65+) PRESERVATIVE FREE IM: ICD-10-PCS | Mod: S$GLB,,, | Performed by: FAMILY MEDICINE

## 2021-09-21 PROCEDURE — 1159F MED LIST DOCD IN RCRD: CPT | Mod: S$GLB,,, | Performed by: FAMILY MEDICINE

## 2021-09-21 PROCEDURE — 3008F PR BODY MASS INDEX (BMI) DOCUMENTED: ICD-10-PCS | Mod: S$GLB,,, | Performed by: FAMILY MEDICINE

## 2021-09-21 PROCEDURE — 1160F RVW MEDS BY RX/DR IN RCRD: CPT | Mod: S$GLB,,, | Performed by: FAMILY MEDICINE

## 2021-09-21 PROCEDURE — 3078F PR MOST RECENT DIASTOLIC BLOOD PRESSURE < 80 MM HG: ICD-10-PCS | Mod: S$GLB,,, | Performed by: FAMILY MEDICINE

## 2021-09-21 PROCEDURE — G0008 FLU VACCINE - QUADRIVALENT - HIGH DOSE (65+) PRESERVATIVE FREE IM: ICD-10-PCS | Mod: S$GLB,,, | Performed by: FAMILY MEDICINE

## 2021-09-21 RX ORDER — OMEPRAZOLE 20 MG/1
20 CAPSULE, DELAYED RELEASE ORAL DAILY
Qty: 90 CAPSULE | Refills: 1 | Status: SHIPPED | OUTPATIENT
Start: 2021-09-21 | End: 2022-12-27 | Stop reason: ALTCHOICE

## 2021-09-21 RX ORDER — ALPRAZOLAM 2 MG/1
2 TABLET ORAL DAILY
COMMUNITY
Start: 2021-05-20

## 2021-09-21 RX ORDER — DOCUSATE SODIUM 100 MG/1
CAPSULE, LIQUID FILLED ORAL
COMMUNITY
Start: 2021-09-01

## 2021-09-27 ENCOUNTER — TELEPHONE (OUTPATIENT)
Dept: FAMILY MEDICINE | Facility: CLINIC | Age: 71
End: 2021-09-27

## 2022-03-24 ENCOUNTER — OFFICE VISIT (OUTPATIENT)
Dept: FAMILY MEDICINE | Facility: CLINIC | Age: 72
End: 2022-03-24
Payer: MEDICARE

## 2022-03-24 VITALS
WEIGHT: 176 LBS | HEART RATE: 80 BPM | SYSTOLIC BLOOD PRESSURE: 128 MMHG | DIASTOLIC BLOOD PRESSURE: 74 MMHG | BODY MASS INDEX: 33.23 KG/M2 | HEIGHT: 61 IN

## 2022-03-24 DIAGNOSIS — Z12.31 OTHER SCREENING MAMMOGRAM: ICD-10-CM

## 2022-03-24 DIAGNOSIS — K21.9 GASTROESOPHAGEAL REFLUX DISEASE WITHOUT ESOPHAGITIS: ICD-10-CM

## 2022-03-24 DIAGNOSIS — I10 ESSENTIAL HYPERTENSION: ICD-10-CM

## 2022-03-24 DIAGNOSIS — M85.851 OSTEOPENIA OF BOTH HIPS: ICD-10-CM

## 2022-03-24 DIAGNOSIS — E11.9 TYPE 2 DIABETES MELLITUS WITHOUT COMPLICATION, WITHOUT LONG-TERM CURRENT USE OF INSULIN: Primary | ICD-10-CM

## 2022-03-24 DIAGNOSIS — M75.42 ROTATOR CUFF IMPINGEMENT SYNDROME OF LEFT SHOULDER: ICD-10-CM

## 2022-03-24 DIAGNOSIS — E03.9 ACQUIRED HYPOTHYROIDISM: ICD-10-CM

## 2022-03-24 DIAGNOSIS — J30.1 SEASONAL ALLERGIC RHINITIS DUE TO POLLEN: ICD-10-CM

## 2022-03-24 DIAGNOSIS — M47.817 LUMBOSACRAL SPONDYLOSIS WITHOUT MYELOPATHY: ICD-10-CM

## 2022-03-24 DIAGNOSIS — M85.852 OSTEOPENIA OF BOTH HIPS: ICD-10-CM

## 2022-03-24 PROCEDURE — 3074F SYST BP LT 130 MM HG: CPT | Mod: S$GLB,,, | Performed by: FAMILY MEDICINE

## 2022-03-24 PROCEDURE — 3078F DIAST BP <80 MM HG: CPT | Mod: S$GLB,,, | Performed by: FAMILY MEDICINE

## 2022-03-24 PROCEDURE — 3288F PR FALLS RISK ASSESSMENT DOCUMENTED: ICD-10-PCS | Mod: S$GLB,,, | Performed by: FAMILY MEDICINE

## 2022-03-24 PROCEDURE — 99214 PR OFFICE/OUTPT VISIT, EST, LEVL IV, 30-39 MIN: ICD-10-PCS | Mod: S$GLB,,, | Performed by: FAMILY MEDICINE

## 2022-03-24 PROCEDURE — 1159F PR MEDICATION LIST DOCUMENTED IN MEDICAL RECORD: ICD-10-PCS | Mod: S$GLB,,, | Performed by: FAMILY MEDICINE

## 2022-03-24 PROCEDURE — 3008F PR BODY MASS INDEX (BMI) DOCUMENTED: ICD-10-PCS | Mod: S$GLB,,, | Performed by: FAMILY MEDICINE

## 2022-03-24 PROCEDURE — 1101F PT FALLS ASSESS-DOCD LE1/YR: CPT | Mod: S$GLB,,, | Performed by: FAMILY MEDICINE

## 2022-03-24 PROCEDURE — 1159F MED LIST DOCD IN RCRD: CPT | Mod: S$GLB,,, | Performed by: FAMILY MEDICINE

## 2022-03-24 PROCEDURE — 1101F PR PT FALLS ASSESS DOC 0-1 FALLS W/OUT INJ PAST YR: ICD-10-PCS | Mod: S$GLB,,, | Performed by: FAMILY MEDICINE

## 2022-03-24 PROCEDURE — 3288F FALL RISK ASSESSMENT DOCD: CPT | Mod: S$GLB,,, | Performed by: FAMILY MEDICINE

## 2022-03-24 PROCEDURE — 3008F BODY MASS INDEX DOCD: CPT | Mod: S$GLB,,, | Performed by: FAMILY MEDICINE

## 2022-03-24 PROCEDURE — 3078F PR MOST RECENT DIASTOLIC BLOOD PRESSURE < 80 MM HG: ICD-10-PCS | Mod: S$GLB,,, | Performed by: FAMILY MEDICINE

## 2022-03-24 PROCEDURE — 3074F PR MOST RECENT SYSTOLIC BLOOD PRESSURE < 130 MM HG: ICD-10-PCS | Mod: S$GLB,,, | Performed by: FAMILY MEDICINE

## 2022-03-24 PROCEDURE — 99214 OFFICE O/P EST MOD 30 MIN: CPT | Mod: S$GLB,,, | Performed by: FAMILY MEDICINE

## 2022-03-24 RX ORDER — LEVOTHYROXINE SODIUM 100 UG/1
100 TABLET ORAL
Qty: 90 TABLET | Refills: 1 | Status: SHIPPED | OUTPATIENT
Start: 2022-03-24 | End: 2022-04-28

## 2022-03-24 NOTE — PROGRESS NOTES
SUBJECTIVE:    Patient ID: Cora Larry is a 71 y.o. female.    Chief Complaint: Follow-up (No bottles, reviewed from list, mammo ordered, foot exam setup//dp)    71-year-old here for six-month checkup.  She sees Dr. Villa for Cardiology.  She had a  recent nuclear stress test that was normal in 2021. She denies chest pain or shortness of breath.    She states she has been eating a lot of sweets lately and not getting much exercise.  Her A1c louie from 6.7 up to 7.2.  She has lost 3 lb.    Her TSH is showing some hypothyroidism.  On Synthroid 50 mcg daily    Stool for blood test was negative in February of 2021. She agrees to do another 1 for this year.      Last mammogram was March of 2021, she agrees to do another.      No visits with results within 6 Month(s) from this visit.   Latest known visit with results is:   Orders Only on 03/26/2021   Component Date Value Ref Range Status    Fecal Globin by [facility]. (Medic* 03/26/2021    Final       Past Medical History:   Diagnosis Date    Arthritis     Back pain     Depression     Diabetes mellitus     Hypercholesteremia     Seasonal allergies      Social History     Socioeconomic History    Marital status:    Tobacco Use    Smoking status: Current Some Day Smoker     Packs/day: 0.25     Years: 57.00     Pack years: 14.25     Types: Cigarettes    Smokeless tobacco: Never Used   Substance and Sexual Activity    Alcohol use: Never     Alcohol/week: 1.0 standard drink     Types: 1 Glasses of wine per week     Comment: 2 times per month    Drug use: Never    Sexual activity: Not Currently     Partners: Male     Birth control/protection: Partner-Vasectomy     Past Surgical History:   Procedure Laterality Date    CHOLECYSTECTOMY      COSMETIC SURGERY      lucero      dec 2011    EYE SURGERY       Family History   Problem Relation Age of Onset    Breast cancer Sister     Breast cancer Maternal Aunt     Asthma Sister     Cancer Sister      Diabetes Sister     Heart disease Sister     Hyperlipidemia Sister     Hypertension Sister        Review of patient's allergies indicates:   Allergen Reactions    Nexium [esomeprazole magnesium] Hives    Lorabid [loracarbef] Hives    Sulfa (sulfonamide antibiotics) Hives       Current Outpatient Medications:     ALPRAZolam (XANAX) 2 MG Tab, Take 2 mg by mouth once daily., Disp: , Rfl:     amLODIPine (NORVASC) 5 MG tablet, Take 1 tablet by mouth once daily., Disp: , Rfl:     ascorbic acid, vitamin C, (VITAMIN C) 1000 MG tablet, , Disp: , Rfl:     aspirin (ECOTRIN) 81 MG EC tablet, Take 81 mg by mouth once daily., Disp: , Rfl:     BYSTOLIC 5 mg Tab, Take 1 tablet by mouth once daily., Disp: , Rfl:     docusate sodium (COLACE) 100 MG capsule, , Disp: , Rfl:     FARXIGA 10 mg tablet, Take 1 tablet (10 mg total) by mouth once daily., Disp: 30 tablet, Rfl: 0    metFORMIN (GLUCOPHAGE) 500 MG tablet, 1,000 mg., Disp: , Rfl:     omega-3s/dha/epa/fish oil/D3 (VITAMIN-D + OMEGA-3 ORAL), , Disp: , Rfl:     omeprazole (PRILOSEC) 20 MG capsule, Take 1 capsule (20 mg total) by mouth once daily., Disp: 90 capsule, Rfl: 1    PROAIR HFA 90 mcg/actuation inhaler, , Disp: , Rfl:     rosuvastatin (CRESTOR) 10 MG tablet, Take 10 mg by mouth once daily., Disp: , Rfl:     zinc acetate 50 mg (zinc) Cap, , Disp: , Rfl:     zolpidem (AMBIEN) 10 mg Tab, Take 10 mg by mouth nightly as needed., Disp: , Rfl:     levothyroxine (SYNTHROID) 100 MCG tablet, Take 1 tablet (100 mcg total) by mouth before breakfast., Disp: 90 tablet, Rfl: 1  No current facility-administered medications for this visit.    Facility-Administered Medications Ordered in Other Visits:     betamethasone acetate-betamethasone sodium phosphate injection, , , PRN, Ruddy Serrano MD, 3 mL at 04/12/12 1002    bupivacaine (PF) 0.25 % (2.5 mg/mL) injection, , , PRN, Ruddy Serrano MD, 3 mL at 04/12/12 1002    iopamidol 61 % intrathecal injection,  ", , PRN, Ruddy Serrano MD, 3 mL at 04/12/12 1001    lidocaine (PF) 10 mg/mL (1 %) injection, , , PRN, Ruddy Serrano MD, 10 mL at 04/12/12 0959    Review of Systems   Constitutional: Negative for appetite change, chills, fatigue, fever and unexpected weight change.   HENT: Negative for congestion, ear pain, sinus pain, sore throat and trouble swallowing.    Eyes: Negative for pain, discharge and visual disturbance.   Respiratory: Negative for apnea, cough, shortness of breath and wheezing.    Cardiovascular: Negative for chest pain, palpitations and leg swelling.   Gastrointestinal: Negative for abdominal pain, blood in stool, constipation, diarrhea, nausea and vomiting.   Endocrine: Negative for heat intolerance, polydipsia and polyuria.   Genitourinary: Negative for difficulty urinating, dyspareunia, dysuria, frequency, hematuria and menstrual problem.   Musculoskeletal: Negative for arthralgias, back pain, gait problem, joint swelling and myalgias.   Allergic/Immunologic: Negative for environmental allergies, food allergies and immunocompromised state.   Neurological: Negative for dizziness, tremors, seizures, numbness and headaches.   Psychiatric/Behavioral: Negative for behavioral problems, confusion, hallucinations and suicidal ideas. The patient is not nervous/anxious.           Objective:      Vitals:    03/24/22 1453   BP: 128/74   Pulse: 80   Weight: 79.8 kg (176 lb)   Height: 5' 1" (1.549 m)     Physical Exam  Vitals and nursing note reviewed.   Constitutional:       General: She is not in acute distress.     Appearance: She is well-developed. She is obese. She is not toxic-appearing.   HENT:      Head: Normocephalic and atraumatic.      Right Ear: Tympanic membrane and external ear normal.      Left Ear: Tympanic membrane and external ear normal.      Nose: Nose normal.      Mouth/Throat:      Pharynx: Oropharynx is clear.   Eyes:      Pupils: Pupils are equal, round, and reactive to light. "   Neck:      Thyroid: No thyromegaly.      Vascular: No carotid bruit.   Cardiovascular:      Rate and Rhythm: Normal rate and regular rhythm.      Heart sounds: Normal heart sounds. No murmur heard.  Pulmonary:      Effort: Pulmonary effort is normal.      Breath sounds: Normal breath sounds. No wheezing or rales.   Abdominal:      General: Bowel sounds are normal. There is no distension.      Palpations: Abdomen is soft.      Tenderness: There is no abdominal tenderness.   Musculoskeletal:         General: No tenderness or deformity. Normal range of motion.      Cervical back: Normal range of motion and neck supple.      Lumbar back: Normal. No spasms.      Comments: Bends 90 degrees at  waist, shoulders and knees have good range of motion no crepitance.  No pitting edema to lower extremities.   Feet:      Right foot:      Protective Sensation: 5 sites tested. 5 sites sensed.      Left foot:      Protective Sensation: 5 sites tested. 5 sites sensed.   Lymphadenopathy:      Cervical: No cervical adenopathy.   Skin:     General: Skin is warm and dry.      Findings: No rash.   Neurological:      Mental Status: She is alert and oriented to person, place, and time.      Cranial Nerves: No cranial nerve deficit.      Coordination: Coordination normal.   Psychiatric:         Behavior: Behavior normal.         Thought Content: Thought content normal.         Judgment: Judgment normal.           Assessment:       1. Type 2 diabetes mellitus without complication, without long-term current use of insulin    2. Acquired hypothyroidism    3. Other screening mammogram    4. Lumbosacral spondylosis without myelopathy    5. Essential hypertension    6. Gastroesophageal reflux disease without esophagitis    7. Osteopenia of both hips    8. Rotator cuff impingement syndrome of left shoulder    9. Seasonal allergic rhinitis due to pollen         Plan:       Type 2 diabetes mellitus without complication, without long-term current use  of insulin  -     Foot Exam Performed  A1c louie to 7.2.  She was encouraged to increase her walking to 5000 steps per day and concentrate on weight loss efforts.  Acquired hypothyroidism  -     levothyroxine (SYNTHROID) 100 MCG tablet; Take 1 tablet (100 mcg total) by mouth before breakfast.  Dispense: 90 tablet; Refill: 1  Thyroid meds increased to 100 mcg daily recheck TSH in 4-6 months  Other screening mammogram  -     Mammo Digital Screening Bilat; Future; Expected date: 03/24/2022  Mammogram ordered  Lumbosacral spondylosis without myelopathy    Essential hypertension  Blood pressure well controlled today  Gastroesophageal reflux disease without esophagitis    Osteopenia of both hips    Rotator cuff impingement syndrome of left shoulder    Seasonal allergic rhinitis due to pollen      No follow-ups on file.        3/27/2022 Ruddy Fiore

## 2022-04-08 ENCOUNTER — HOSPITAL ENCOUNTER (OUTPATIENT)
Dept: RADIOLOGY | Facility: HOSPITAL | Age: 72
Discharge: HOME | End: 2022-04-08
Attending: FAMILY MEDICINE
Payer: MEDICARE

## 2022-04-08 DIAGNOSIS — Z12.31 OTHER SCREENING MAMMOGRAM: ICD-10-CM

## 2022-04-08 PROCEDURE — 77063 BREAST TOMOSYNTHESIS BI: CPT | Mod: TC,PO

## 2022-04-08 PROCEDURE — 77067 SCR MAMMO BI INCL CAD: CPT | Mod: TC,PO

## 2022-04-18 ENCOUNTER — TELEPHONE (OUTPATIENT)
Dept: FAMILY MEDICINE | Facility: CLINIC | Age: 72
End: 2022-04-18

## 2022-04-18 NOTE — TELEPHONE ENCOUNTER
----- Message from Ruddy Fiore MD sent at 4/16/2022  6:41 PM CDT -----  Call patient.  Mammogram was normal.  Repeat mammogram in 1 year.

## 2022-04-28 RX ORDER — LEVOTHYROXINE SODIUM 75 UG/1
75 TABLET ORAL
Qty: 30 TABLET | Refills: 2 | Status: SHIPPED | OUTPATIENT
Start: 2022-04-28 | End: 2022-05-15

## 2022-04-28 NOTE — TELEPHONE ENCOUNTER
"Per Dr Fiore "100mcg too strong, will change to levothyroxine 75mcg daily #30 2 refills. Rechck TSH in 2 months."  "

## 2022-04-28 NOTE — TELEPHONE ENCOUNTER
----- Message from Rina Garcia sent at 4/28/2022  2:25 PM CDT -----  Regarding: lab results  Pt brought is lab results to be reviewed.  She is very concerned about the TSH being so low and would like to know what is the next step.  Please call [phone]. Gave to Adriana.

## 2022-06-28 ENCOUNTER — TELEPHONE (OUTPATIENT)
Dept: FAMILY MEDICINE | Facility: CLINIC | Age: 72
End: 2022-06-28

## 2022-06-28 DIAGNOSIS — E03.9 ACQUIRED HYPOTHYROIDISM: Primary | ICD-10-CM

## 2022-06-28 DIAGNOSIS — Z79.899 ENCOUNTER FOR LONG-TERM (CURRENT) USE OF OTHER MEDICATIONS: ICD-10-CM

## 2022-06-28 NOTE — TELEPHONE ENCOUNTER
"----- Message from Adriana Kerr MA sent at 6/28/2022  7:59 AM CDT -----    ----- Message -----  From: Adriana Kerr MA  Sent: 6/28/2022  12:00 AM CDT  To: Ruddy Fiore Staff    Per Dr Fiore "100mcg too strong, will change to levothyroxine 75mcg daily #30 2 refills. Rechck TSH in 2 months."      "

## 2022-07-14 LAB — TSH SERPL DL<=0.005 MIU/L-ACNC: 1.03 UIU/ML (ref 0.45–4.5)

## 2022-07-18 ENCOUNTER — TELEPHONE (OUTPATIENT)
Dept: FAMILY MEDICINE | Facility: CLINIC | Age: 72
End: 2022-07-18

## 2022-07-18 NOTE — TELEPHONE ENCOUNTER
----- Message from Ruddy Fiore MD sent at 7/17/2022  2:28 PM CDT -----  Call patient.  Thyroid functions look normal.  Continue current medications.

## 2022-09-27 ENCOUNTER — OFFICE VISIT (OUTPATIENT)
Dept: FAMILY MEDICINE | Facility: CLINIC | Age: 72
End: 2022-09-27
Payer: MEDICARE

## 2022-09-27 VITALS
HEART RATE: 72 BPM | OXYGEN SATURATION: 95 % | HEIGHT: 61 IN | DIASTOLIC BLOOD PRESSURE: 78 MMHG | BODY MASS INDEX: 33.79 KG/M2 | SYSTOLIC BLOOD PRESSURE: 128 MMHG | WEIGHT: 179 LBS

## 2022-09-27 DIAGNOSIS — M47.817 LUMBOSACRAL SPONDYLOSIS WITHOUT MYELOPATHY: ICD-10-CM

## 2022-09-27 DIAGNOSIS — M48.02 FORAMINAL STENOSIS OF CERVICAL REGION: ICD-10-CM

## 2022-09-27 DIAGNOSIS — E11.9 TYPE 2 DIABETES MELLITUS WITHOUT COMPLICATION, WITHOUT LONG-TERM CURRENT USE OF INSULIN: Primary | ICD-10-CM

## 2022-09-27 DIAGNOSIS — J30.1 SEASONAL ALLERGIC RHINITIS DUE TO POLLEN: ICD-10-CM

## 2022-09-27 DIAGNOSIS — I10 ESSENTIAL HYPERTENSION: ICD-10-CM

## 2022-09-27 DIAGNOSIS — K21.9 GASTROESOPHAGEAL REFLUX DISEASE WITHOUT ESOPHAGITIS: ICD-10-CM

## 2022-09-27 DIAGNOSIS — Z12.11 SPECIAL SCREENING FOR MALIGNANT NEOPLASMS, COLON: ICD-10-CM

## 2022-09-27 DIAGNOSIS — E03.9 ACQUIRED HYPOTHYROIDISM: ICD-10-CM

## 2022-09-27 PROCEDURE — 1159F MED LIST DOCD IN RCRD: CPT | Mod: CPTII,S$GLB,, | Performed by: FAMILY MEDICINE

## 2022-09-27 PROCEDURE — 99214 PR OFFICE/OUTPT VISIT, EST, LEVL IV, 30-39 MIN: ICD-10-PCS | Mod: S$GLB,,, | Performed by: FAMILY MEDICINE

## 2022-09-27 PROCEDURE — 1101F PR PT FALLS ASSESS DOC 0-1 FALLS W/OUT INJ PAST YR: ICD-10-PCS | Mod: CPTII,S$GLB,, | Performed by: FAMILY MEDICINE

## 2022-09-27 PROCEDURE — 3008F PR BODY MASS INDEX (BMI) DOCUMENTED: ICD-10-PCS | Mod: CPTII,S$GLB,, | Performed by: FAMILY MEDICINE

## 2022-09-27 PROCEDURE — 1101F PT FALLS ASSESS-DOCD LE1/YR: CPT | Mod: CPTII,S$GLB,, | Performed by: FAMILY MEDICINE

## 2022-09-27 PROCEDURE — 3008F BODY MASS INDEX DOCD: CPT | Mod: CPTII,S$GLB,, | Performed by: FAMILY MEDICINE

## 2022-09-27 PROCEDURE — 3078F DIAST BP <80 MM HG: CPT | Mod: CPTII,S$GLB,, | Performed by: FAMILY MEDICINE

## 2022-09-27 PROCEDURE — 3074F PR MOST RECENT SYSTOLIC BLOOD PRESSURE < 130 MM HG: ICD-10-PCS | Mod: CPTII,S$GLB,, | Performed by: FAMILY MEDICINE

## 2022-09-27 PROCEDURE — 3078F PR MOST RECENT DIASTOLIC BLOOD PRESSURE < 80 MM HG: ICD-10-PCS | Mod: CPTII,S$GLB,, | Performed by: FAMILY MEDICINE

## 2022-09-27 PROCEDURE — 3288F FALL RISK ASSESSMENT DOCD: CPT | Mod: CPTII,S$GLB,, | Performed by: FAMILY MEDICINE

## 2022-09-27 PROCEDURE — 3074F SYST BP LT 130 MM HG: CPT | Mod: CPTII,S$GLB,, | Performed by: FAMILY MEDICINE

## 2022-09-27 PROCEDURE — 3288F PR FALLS RISK ASSESSMENT DOCUMENTED: ICD-10-PCS | Mod: CPTII,S$GLB,, | Performed by: FAMILY MEDICINE

## 2022-09-27 PROCEDURE — 1159F PR MEDICATION LIST DOCUMENTED IN MEDICAL RECORD: ICD-10-PCS | Mod: CPTII,S$GLB,, | Performed by: FAMILY MEDICINE

## 2022-09-27 PROCEDURE — 99214 OFFICE O/P EST MOD 30 MIN: CPT | Mod: S$GLB,,, | Performed by: FAMILY MEDICINE

## 2022-09-27 RX ORDER — INSULIN PUMP SYRINGE, 3 ML
EACH MISCELLANEOUS
Qty: 1 EACH | Refills: 0 | Status: SHIPPED | OUTPATIENT
Start: 2022-09-27 | End: 2023-09-27

## 2022-09-27 RX ORDER — GLIPIZIDE 5 MG/1
TABLET ORAL
Qty: 30 TABLET | Refills: 5 | Status: SHIPPED | OUTPATIENT
Start: 2022-09-27 | End: 2023-06-14

## 2022-09-27 RX ORDER — LANCETS
EACH MISCELLANEOUS
Qty: 100 EACH | Refills: 1 | Status: SHIPPED | OUTPATIENT
Start: 2022-09-27

## 2022-10-02 NOTE — PROGRESS NOTES
SUBJECTIVE:    Patient ID: Cora Larry is a 71 y.o. female.    Chief Complaint: Follow-up (6 mo f/u//no med bottles//declined flu and pna at this time//tc)    This is a six-month checkup for 71-year-old female.  She states she has not been exercising very much this summer.    Type 2 diabetes currently on metformin 1000 mg b.i.d. and Farxiga 10 mg q.day.  A1c rising up to 7.4.    Cardiology Dr. Villa-recent echocardiogram 60-65% ejection fraction.    Constipation treated with MiraLax successfully    She has never had a colonoscopy      Telephone on 06/28/2022   Component Date Value Ref Range Status    TSH 07/13/2022 1.030  0.450 - 4.500 uIU/mL Final       Past Medical History:   Diagnosis Date    Arthritis     Back pain     Depression     Diabetes mellitus     Hypercholesteremia     Seasonal allergies      Social History     Socioeconomic History    Marital status:    Tobacco Use    Smoking status: Some Days     Packs/day: 0.25     Years: 57.00     Pack years: 14.25     Types: Cigarettes    Smokeless tobacco: Never   Substance and Sexual Activity    Alcohol use: Never     Alcohol/week: 1.0 standard drink     Types: 1 Glasses of wine per week     Comment: 2 times per month    Drug use: Never    Sexual activity: Not Currently     Partners: Male     Birth control/protection: Partner-Vasectomy     Past Surgical History:   Procedure Laterality Date    CHOLECYSTECTOMY      COSMETIC SURGERY      lucero      dec 2011    EYE SURGERY       Family History   Problem Relation Age of Onset    Breast cancer Sister     Breast cancer Maternal Aunt     Asthma Sister     Cancer Sister     Diabetes Sister     Heart disease Sister     Hyperlipidemia Sister     Hypertension Sister        Review of patient's allergies indicates:   Allergen Reactions    Nexium [esomeprazole magnesium] Hives    Lorabid [loracarbef] Hives    Sulfa (sulfonamide antibiotics) Hives       Current Outpatient Medications:     ALPRAZolam (XANAX) 2  MG Tab, Take 2 mg by mouth once daily., Disp: , Rfl:     amLODIPine (NORVASC) 5 MG tablet, Take 1 tablet by mouth once daily., Disp: , Rfl:     ascorbic acid, vitamin C, (VITAMIN C) 1000 MG tablet, , Disp: , Rfl:     aspirin (ECOTRIN) 81 MG EC tablet, Take 81 mg by mouth once daily., Disp: , Rfl:     blood sugar diagnostic Strp, To check BG 1 times daily, to use with insurance preferred meter, Disp: 100 strip, Rfl: 1    blood-glucose meter kit, To check BG 1 times daily, to use with insurance preferred meter, Disp: 1 each, Rfl: 0    BYSTOLIC 5 mg Tab, Take 1 tablet by mouth once daily., Disp: , Rfl:     docusate sodium (COLACE) 100 MG capsule, , Disp: , Rfl:     FARXIGA 10 mg tablet, Take 1 tablet (10 mg total) by mouth once daily., Disp: 30 tablet, Rfl: 0    glipiZIDE (GLUCOTROL) 5 MG tablet, Take 1/2 tablet every morning, Disp: 30 tablet, Rfl: 5    lancets Misc, To check BG 1 times daily, to use with insurance preferred meter, Disp: 100 each, Rfl: 1    levothyroxine (EUTHYROX) 75 MCG tablet, Take 1 tablet (75 mcg total) by mouth before breakfast., Disp: 90 tablet, Rfl: 1    metFORMIN (GLUCOPHAGE) 500 MG tablet, Take 1,000 mg by mouth 2 (two) times daily with meals., Disp: , Rfl:     omega-3s/dha/epa/fish oil/D3 (VITAMIN-D + OMEGA-3 ORAL), , Disp: , Rfl:     omeprazole (PRILOSEC) 20 MG capsule, Take 1 capsule (20 mg total) by mouth once daily., Disp: 90 capsule, Rfl: 1    PROAIR HFA 90 mcg/actuation inhaler, , Disp: , Rfl:     rosuvastatin (CRESTOR) 10 MG tablet, Take 10 mg by mouth once daily., Disp: , Rfl:     zinc acetate 50 mg (zinc) Cap, , Disp: , Rfl:     zolpidem (AMBIEN) 10 mg Tab, Take 10 mg by mouth nightly as needed., Disp: , Rfl:   No current facility-administered medications for this visit.    Facility-Administered Medications Ordered in Other Visits:     betamethasone acetate-betamethasone sodium phosphate injection, , , PRN, Ruddy Serrano MD, 3 mL at 04/12/12 1002    bupivacaine (PF) 0.25 %  "(2.5 mg/mL) injection, , , PRN, Ruddy Serrano MD, 3 mL at 04/12/12 1002    iopamidol 61 % intrathecal injection, , , PRN, Ruddy Serrano MD, 3 mL at 04/12/12 1001    lidocaine (PF) 10 mg/mL (1 %) injection, , , PRN, Ruddy Serrano MD, 10 mL at 04/12/12 0959    Review of Systems   Constitutional:  Negative for appetite change, chills, fatigue, fever and unexpected weight change.   HENT:  Negative for congestion, ear pain, sinus pain, sore throat and trouble swallowing.    Eyes:  Negative for pain, discharge and visual disturbance.   Respiratory:  Negative for apnea, cough, shortness of breath and wheezing.    Cardiovascular:  Negative for chest pain, palpitations and leg swelling.   Gastrointestinal:  Negative for abdominal pain, blood in stool, constipation, diarrhea, nausea and vomiting.   Endocrine: Negative for heat intolerance, polydipsia and polyuria.   Genitourinary:  Negative for difficulty urinating, dyspareunia, dysuria, frequency, hematuria and menstrual problem.   Musculoskeletal:  Negative for arthralgias, back pain, gait problem, joint swelling and myalgias.   Allergic/Immunologic: Negative for environmental allergies, food allergies and immunocompromised state.   Neurological:  Negative for dizziness, tremors, seizures, numbness and headaches.   Psychiatric/Behavioral:  Negative for behavioral problems, confusion, hallucinations and suicidal ideas. The patient is not nervous/anxious.         Objective:      Vitals:    09/27/22 1504   BP: 128/78   Pulse: 72   SpO2: 95%   Weight: 81.2 kg (179 lb)   Height: 5' 1" (1.549 m)     Physical Exam  Vitals and nursing note reviewed.   Constitutional:       General: She is not in acute distress.     Appearance: She is well-developed. She is obese. She is not toxic-appearing.   HENT:      Head: Normocephalic and atraumatic.      Right Ear: Tympanic membrane and external ear normal.      Left Ear: Tympanic membrane and external ear normal.      Nose: " Nose normal.      Mouth/Throat:      Pharynx: Oropharynx is clear.   Eyes:      Pupils: Pupils are equal, round, and reactive to light.   Neck:      Thyroid: No thyromegaly.      Vascular: No carotid bruit.   Cardiovascular:      Rate and Rhythm: Normal rate and regular rhythm.      Heart sounds: Normal heart sounds. No murmur heard.  Pulmonary:      Effort: Pulmonary effort is normal.      Breath sounds: Normal breath sounds. No wheezing or rales.   Abdominal:      General: Bowel sounds are normal. There is no distension.      Palpations: Abdomen is soft.      Tenderness: There is no abdominal tenderness.   Musculoskeletal:         General: No tenderness or deformity. Normal range of motion.      Cervical back: Normal range of motion and neck supple.      Lumbar back: Normal. No spasms.      Comments: Bends 90 degrees at  waist, shoulders and knees good range of motion without crepitance.  No pitting edema to lower extremities   Feet:      Right foot:      Protective Sensation: 5 sites tested.  5 sites sensed.      Skin integrity: Skin integrity normal.      Left foot:      Protective Sensation: 5 sites tested.  5 sites sensed.      Skin integrity: Skin integrity normal.   Lymphadenopathy:      Cervical: No cervical adenopathy.   Skin:     General: Skin is warm and dry.      Findings: No rash.   Neurological:      Mental Status: She is alert and oriented to person, place, and time.      Cranial Nerves: No cranial nerve deficit.      Coordination: Coordination normal.   Psychiatric:         Behavior: Behavior normal.         Thought Content: Thought content normal.         Judgment: Judgment normal.         Assessment:       1. Type 2 diabetes mellitus without complication, without long-term current use of insulin    2. Special screening for malignant neoplasms, colon    3. Foraminal stenosis of cervical region    4. Lumbosacral spondylosis without myelopathy    5. Seasonal allergic rhinitis due to pollen    6.  Essential hypertension    7. Acquired hypothyroidism    8. Gastroesophageal reflux disease without esophagitis           Plan:       Type 2 diabetes mellitus without complication, without long-term current use of insulin  -     glipiZIDE (GLUCOTROL) 5 MG tablet; Take 1/2 tablet every morning  Dispense: 30 tablet; Refill: 5  -     blood-glucose meter kit; To check BG 1 times daily, to use with insurance preferred meter  Dispense: 1 each; Refill: 0  -     lancets Misc; To check BG 1 times daily, to use with insurance preferred meter  Dispense: 100 each; Refill: 1  -     blood sugar diagnostic Strp; To check BG 1 times daily, to use with insurance preferred meter  Dispense: 100 strip; Refill: 1  Type 2 diabetes slightly worsening control.  Will add glipizide 2.5 mg q.a.m., prescribed glucometer and supplies for daily glucose measurements  Special screening for malignant neoplasms, colon  Encourage patient to consider colonoscopy of colorectal screening  Foraminal stenosis of cervical region    Lumbosacral spondylosis without myelopathy    Seasonal allergic rhinitis due to pollen    Essential hypertension  Blood pressure well controlled, followed by Dr. Villa  Acquired hypothyroidism    Gastroesophageal reflux disease without esophagitis  Recommend pneumonia vaccine this winter  Follow up in 3 months (on 12/27/2022), or [facility] for Diabetic Check-Up.        10/2/2022 Ruddy Fiore

## 2022-10-10 LAB
LEFT EYE DM RETINOPATHY: POSITIVE
RIGHT EYE DM RETINOPATHY: POSITIVE

## 2022-11-09 NOTE — TELEPHONE ENCOUNTER
----- Message from Regina Hylton sent at 11/9/2022  2:02 PM CST -----  - pt needs refill on levothyroxine   [facility]   [phone]

## 2022-11-10 ENCOUNTER — TELEPHONE (OUTPATIENT)
Dept: FAMILY MEDICINE | Facility: CLINIC | Age: 72
End: 2022-11-10

## 2022-11-10 RX ORDER — LEVOTHYROXINE SODIUM 75 UG/1
75 TABLET ORAL
Qty: 90 TABLET | Refills: 1 | Status: SHIPPED | OUTPATIENT
Start: 2022-11-10 | End: 2023-03-27 | Stop reason: SDUPTHER

## 2022-11-10 NOTE — TELEPHONE ENCOUNTER
----- Message from Adriana Kerr MA sent at 11/10/2022 11:16 AM CST -----    ----- Message -----  From: Regina Hylton  Sent: 11/10/2022  11:03 AM CST  To: Ruddy Fiore Staff    - pt needs refill on levothyroxine. This is her second day calling   [phone]

## 2022-11-10 NOTE — TELEPHONE ENCOUNTER
----- Message from Adriana Kerr MA sent at 11/10/2022 11:20 AM CST -----    ----- Message -----  From: Amna Saeed  Sent: 11/10/2022  11:16 AM CST  To: Ruddy Metcalf with [facility] called and stated that the patient need a refill of her levothyroxine if any questions please contact him at [phone]

## 2022-12-27 ENCOUNTER — OFFICE VISIT (OUTPATIENT)
Dept: FAMILY MEDICINE | Facility: CLINIC | Age: 72
End: 2022-12-27
Payer: MEDICARE

## 2022-12-27 VITALS
DIASTOLIC BLOOD PRESSURE: 80 MMHG | HEIGHT: 61 IN | HEART RATE: 70 BPM | BODY MASS INDEX: 32.97 KG/M2 | OXYGEN SATURATION: 96 % | SYSTOLIC BLOOD PRESSURE: 126 MMHG | WEIGHT: 174.63 LBS

## 2022-12-27 DIAGNOSIS — Z12.11 SPECIAL SCREENING FOR MALIGNANT NEOPLASMS, COLON: ICD-10-CM

## 2022-12-27 DIAGNOSIS — E11.9 TYPE 2 DIABETES MELLITUS WITHOUT COMPLICATION, WITHOUT LONG-TERM CURRENT USE OF INSULIN: Primary | ICD-10-CM

## 2022-12-27 DIAGNOSIS — K21.9 GASTROESOPHAGEAL REFLUX DISEASE WITHOUT ESOPHAGITIS: ICD-10-CM

## 2022-12-27 DIAGNOSIS — Z23 NEED FOR INFLUENZA VACCINATION: ICD-10-CM

## 2022-12-27 DIAGNOSIS — E03.9 ACQUIRED HYPOTHYROIDISM: ICD-10-CM

## 2022-12-27 DIAGNOSIS — I10 ESSENTIAL HYPERTENSION: ICD-10-CM

## 2022-12-27 DIAGNOSIS — Z23 NEED FOR PNEUMOCOCCAL VACCINE: ICD-10-CM

## 2022-12-27 LAB — HBA1C MFR BLD: 6.2 %

## 2022-12-27 PROCEDURE — G0009 ADMIN PNEUMOCOCCAL VACCINE: HCPCS | Mod: S$GLB,,, | Performed by: PHYSICIAN ASSISTANT

## 2022-12-27 PROCEDURE — G0009 PNEUMOCOCCAL CONJUGATE VACCINE 20-VALENT: ICD-10-PCS | Mod: S$GLB,,, | Performed by: PHYSICIAN ASSISTANT

## 2022-12-27 PROCEDURE — 3074F PR MOST RECENT SYSTOLIC BLOOD PRESSURE < 130 MM HG: ICD-10-PCS | Mod: CPTII,S$GLB,, | Performed by: PHYSICIAN ASSISTANT

## 2022-12-27 PROCEDURE — 83036 HEMOGLOBIN GLYCOSYLATED A1C: CPT | Mod: QW,,, | Performed by: PHYSICIAN ASSISTANT

## 2022-12-27 PROCEDURE — 90677 PCV20 VACCINE IM: CPT | Mod: S$GLB,,, | Performed by: PHYSICIAN ASSISTANT

## 2022-12-27 PROCEDURE — 1159F PR MEDICATION LIST DOCUMENTED IN MEDICAL RECORD: ICD-10-PCS | Mod: CPTII,S$GLB,, | Performed by: PHYSICIAN ASSISTANT

## 2022-12-27 PROCEDURE — 3044F HG A1C LEVEL LT 7.0%: CPT | Mod: CPTII,S$GLB,, | Performed by: PHYSICIAN ASSISTANT

## 2022-12-27 PROCEDURE — 3008F PR BODY MASS INDEX (BMI) DOCUMENTED: ICD-10-PCS | Mod: CPTII,S$GLB,, | Performed by: PHYSICIAN ASSISTANT

## 2022-12-27 PROCEDURE — 3008F BODY MASS INDEX DOCD: CPT | Mod: CPTII,S$GLB,, | Performed by: PHYSICIAN ASSISTANT

## 2022-12-27 PROCEDURE — 3074F SYST BP LT 130 MM HG: CPT | Mod: CPTII,S$GLB,, | Performed by: PHYSICIAN ASSISTANT

## 2022-12-27 PROCEDURE — 1101F PR PT FALLS ASSESS DOC 0-1 FALLS W/OUT INJ PAST YR: ICD-10-PCS | Mod: CPTII,S$GLB,, | Performed by: PHYSICIAN ASSISTANT

## 2022-12-27 PROCEDURE — 1160F RVW MEDS BY RX/DR IN RCRD: CPT | Mod: CPTII,S$GLB,, | Performed by: PHYSICIAN ASSISTANT

## 2022-12-27 PROCEDURE — 99214 OFFICE O/P EST MOD 30 MIN: CPT | Mod: S$GLB,,, | Performed by: PHYSICIAN ASSISTANT

## 2022-12-27 PROCEDURE — 90677 PNEUMOCOCCAL CONJUGATE VACCINE 20-VALENT: ICD-10-PCS | Mod: S$GLB,,, | Performed by: PHYSICIAN ASSISTANT

## 2022-12-27 PROCEDURE — 1160F PR REVIEW ALL MEDS BY PRESCRIBER/CLIN PHARMACIST DOCUMENTED: ICD-10-PCS | Mod: CPTII,S$GLB,, | Performed by: PHYSICIAN ASSISTANT

## 2022-12-27 PROCEDURE — 99214 PR OFFICE/OUTPT VISIT, EST, LEVL IV, 30-39 MIN: ICD-10-PCS | Mod: S$GLB,,, | Performed by: PHYSICIAN ASSISTANT

## 2022-12-27 PROCEDURE — 1101F PT FALLS ASSESS-DOCD LE1/YR: CPT | Mod: CPTII,S$GLB,, | Performed by: PHYSICIAN ASSISTANT

## 2022-12-27 PROCEDURE — 83036 POCT HEMOGLOBIN A1C: ICD-10-PCS | Mod: QW,,, | Performed by: PHYSICIAN ASSISTANT

## 2022-12-27 PROCEDURE — 3044F PR MOST RECENT HEMOGLOBIN A1C LEVEL <7.0%: ICD-10-PCS | Mod: CPTII,S$GLB,, | Performed by: PHYSICIAN ASSISTANT

## 2022-12-27 PROCEDURE — 1159F MED LIST DOCD IN RCRD: CPT | Mod: CPTII,S$GLB,, | Performed by: PHYSICIAN ASSISTANT

## 2022-12-27 PROCEDURE — 3288F PR FALLS RISK ASSESSMENT DOCUMENTED: ICD-10-PCS | Mod: CPTII,S$GLB,, | Performed by: PHYSICIAN ASSISTANT

## 2022-12-27 PROCEDURE — 3288F FALL RISK ASSESSMENT DOCD: CPT | Mod: CPTII,S$GLB,, | Performed by: PHYSICIAN ASSISTANT

## 2022-12-27 PROCEDURE — 3079F DIAST BP 80-89 MM HG: CPT | Mod: CPTII,S$GLB,, | Performed by: PHYSICIAN ASSISTANT

## 2022-12-27 PROCEDURE — 3079F PR MOST RECENT DIASTOLIC BLOOD PRESSURE 80-89 MM HG: ICD-10-PCS | Mod: CPTII,S$GLB,, | Performed by: PHYSICIAN ASSISTANT

## 2022-12-27 RX ORDER — PANTOPRAZOLE SODIUM 20 MG/1
20 TABLET, DELAYED RELEASE ORAL DAILY
Qty: 90 TABLET | Refills: 1 | Status: SHIPPED | OUTPATIENT
Start: 2022-12-27 | End: 2023-03-27 | Stop reason: SDUPTHER

## 2022-12-27 RX ORDER — SEMAGLUTIDE 1.34 MG/ML
INJECTION, SOLUTION SUBCUTANEOUS
Qty: 1 PEN | Refills: 3 | Status: SHIPPED | OUTPATIENT
Start: 2022-12-27 | End: 2023-01-31

## 2022-12-27 NOTE — PROGRESS NOTES
SUBJECTIVE:    Patient ID: Cora Larry is a 72 y.o. female.    Chief Complaint: Follow-up (No bottles// A1C ordered//went over medications verbally//Discuss PNA vaccine//Pt stated she got flu vaccine somewhere else//Set up cologaurd//BA)    Pt is a 72 y.o. female who presents today for a 3-month DM II follow up.  She is managed on Metformin 1,000 mg b.i.d., Glipizide 2.5mg q.d., and Farxiga 10 mg q.d.. She eats 4 small meals/day. Blood sugar at home has been ranges in the 120s.  Hemoglobin A1c today is excellent at 6.2%.  She is interested in weight weight loss - she is interested in starting Ozempic for both diabetes management, as well as weight loss benefits.  Foot exam and eye exam are UTD.  Today, she reports increased GERD symptoms.  She remains compliant with her Omeprazole 20 mg q.d., but is using OTC Tums (4 tablets) 3x/week.    Dr. Villa ([facility]) - manages her history of HTN. BP at home ranges in the 120/80's mmHg.  BP is stable and well controlled in office today.  Lab work was recently completed with Cardiology on 12/05/2022.    UTD: Mammogram (4/8/22) - negative.     Due for colon screening - Last FOBT (3/26/21) - negative.     Due for pneumococcal vaccine.    She subjectively reports receiving the influenza vaccine at a local pharmacy earlier this month.      Telephone on 06/28/2022   Component Date Value Ref Range Status    TSH 07/13/2022 1.030  0.450 - 4.500 uIU/mL Final       Past Medical History:   Diagnosis Date    Arthritis     Back pain     Depression     Diabetes mellitus     Hypercholesteremia     Seasonal allergies      Past Surgical History:   Procedure Laterality Date    CHOLECYSTECTOMY      COSMETIC SURGERY      lucero      dec 2011    EYE SURGERY       Family History   Problem Relation Age of Onset    Breast cancer Sister     Breast cancer Maternal Aunt     Asthma Sister     Cancer Sister     Diabetes Sister     Heart disease Sister     Hyperlipidemia Sister     Hypertension  Sister        Marital Status:   Alcohol History:  reports no history of alcohol use.  Tobacco History:  reports that she has been smoking cigarettes. She has a 14.25 pack-year smoking history. She has never used smokeless tobacco.  Drug History:  reports no history of drug use.    Health Maintenance Topics with due status: Not Due       Topic Last Completion Date    DEXA Scan 03/23/2021    Foot Exam 03/24/2022    Mammogram 04/08/2022    Diabetes Urine Screening 04/20/2022    Lipid Panel 08/18/2022    Hemoglobin A1c 08/18/2022    Eye Exam 10/10/2022    Low Dose Statin 12/27/2022     Immunization History   Administered Date(s) Administered    COVID-19, MRNA, LN-S, PF (Pfizer) (Purple Cap) 01/29/2021, 02/23/2021    Influenza - High Dose - PF (65 years and older) 11/30/2017, 11/07/2018    Influenza - Quadrivalent - High Dose - PF (65 years and older) 09/17/2020, 09/21/2021    Pneumococcal Conjugate - 13 Valent 09/17/2020    Zoster Recombinant 04/11/2018, 09/07/2018       Review of patient's allergies indicates:   Allergen Reactions    Nexium [esomeprazole magnesium] Hives    Lorabid [loracarbef] Hives    Sulfa (sulfonamide antibiotics) Hives       Current Outpatient Medications:     ALPRAZolam (XANAX) 2 MG Tab, Take 2 mg by mouth once daily., Disp: , Rfl:     amLODIPine (NORVASC) 5 MG tablet, Take 1 tablet by mouth once daily., Disp: , Rfl:     aspirin (ECOTRIN) 81 MG EC tablet, Take 81 mg by mouth once daily., Disp: , Rfl:     blood sugar diagnostic Strp, To check BG 1 times daily, to use with insurance preferred meter, Disp: 100 strip, Rfl: 1    blood-glucose meter kit, To check BG 1 times daily, to use with insurance preferred meter, Disp: 1 each, Rfl: 0    BYSTOLIC 5 mg Tab, Take 1 tablet by mouth once daily., Disp: , Rfl:     docusate sodium (COLACE) 100 MG capsule, , Disp: , Rfl:     FARXIGA 10 mg tablet, Take 1 tablet (10 mg total) by mouth once daily., Disp: 30 tablet, Rfl: 0    glipiZIDE (GLUCOTROL) 5 MG  "tablet, Take 1/2 tablet every morning, Disp: 30 tablet, Rfl: 5    lancets Misc, To check BG 1 times daily, to use with insurance preferred meter, Disp: 100 each, Rfl: 1    levothyroxine (EUTHYROX) 75 MCG tablet, Take 1 tablet (75 mcg total) by mouth before breakfast., Disp: 90 tablet, Rfl: 1    metFORMIN (GLUCOPHAGE) 500 MG tablet, Take 1,000 mg by mouth 2 (two) times daily with meals., Disp: , Rfl:     omega-3s/dha/epa/fish oil/D3 (VITAMIN-D + OMEGA-3 ORAL), , Disp: , Rfl:     PROAIR HFA 90 mcg/actuation inhaler, , Disp: , Rfl:     rosuvastatin (CRESTOR) 10 MG tablet, Take 10 mg by mouth once daily., Disp: , Rfl:     zolpidem (AMBIEN) 10 mg Tab, Take 10 mg by mouth nightly as needed., Disp: , Rfl:     ascorbic acid, vitamin C, (VITAMIN C) 1000 MG tablet, , Disp: , Rfl:     pantoprazole (PROTONIX) 20 MG tablet, Take 1 tablet (20 mg total) by mouth once daily., Disp: 90 tablet, Rfl: 1    semaglutide (OZEMPIC) 0.25 mg or 0.5 mg(2 mg/1.5 mL) pen injector, Inject 0.25 mg into the skin every 7 days for 2 weeks, then increase to 0.5 mg into the skin every 7 days., Disp: 1 pen, Rfl: 3    zinc acetate 50 mg (zinc) Cap, , Disp: , Rfl:   No current facility-administered medications for this visit.    Facility-Administered Medications Ordered in Other Visits:     betamethasone acetate-betamethasone sodium phosphate injection, , , PRN, Ruddy Serrano MD, 3 mL at 04/12/12 1002    bupivacaine (PF) 0.25 % (2.5 mg/mL) injection, , , PRN, Ruddy Serrano MD, 3 mL at 04/12/12 1002    iopamidol 61 % intrathecal injection, , , PRN, Ruddy Serrano MD, 3 mL at 04/12/12 1001    lidocaine (PF) 10 mg/mL (1 %) injection, , , PRN, Ruddy Serraon MD, 10 mL at 04/12/12 0959    Review of Systems   Constitutional:  Negative for activity change, chills, fatigue and fever.        "Want to discuss getting on Ozempic for weight loss."   Respiratory:  Negative for cough, shortness of breath and wheezing.    Cardiovascular:  Negative " "for chest pain and palpitations.   Gastrointestinal:  Negative for abdominal pain, constipation, diarrhea, nausea and vomiting.        "Still having acid reflux."   Genitourinary: Negative.    Musculoskeletal:  Negative for arthralgias and myalgias.   Neurological:  Negative for dizziness, syncope, weakness, light-headedness and headaches.   Psychiatric/Behavioral:  Negative for behavioral problems.         Objective:      Vitals:    12/27/22 1511   BP: 126/80   Pulse: 70   SpO2: 96%   Weight: 79.2 kg (174 lb 9.6 oz)   Height: 5' 1.2" (1.554 m)     Physical Exam  Vitals and nursing note reviewed.   Constitutional:       General: She is not in acute distress.     Appearance: She is well-developed. She is obese. She is not ill-appearing or toxic-appearing.   HENT:      Head: Normocephalic and atraumatic.      Right Ear: External ear normal.      Left Ear: External ear normal.      Nose: Nose normal. No rhinorrhea.      Mouth/Throat:      Mouth: Mucous membranes are moist.      Pharynx: Oropharynx is clear.   Eyes:      General: No scleral icterus.     Extraocular Movements: Extraocular movements intact.      Conjunctiva/sclera: Conjunctivae normal.      Pupils: Pupils are equal, round, and reactive to light.   Neck:      Thyroid: No thyromegaly.   Cardiovascular:      Rate and Rhythm: Normal rate and regular rhythm.      Pulses:           Radial pulses are 2+ on the right side and 2+ on the left side.        Posterior tibial pulses are 2+ on the right side and 2+ on the left side.      Heart sounds: Normal heart sounds. No murmur heard.  Pulmonary:      Effort: Pulmonary effort is normal.      Breath sounds: Normal breath sounds. No wheezing or rales.   Abdominal:      General: Bowel sounds are normal. There is no distension.      Palpations: Abdomen is soft.      Tenderness: There is no abdominal tenderness.   Musculoskeletal:         General: No tenderness or deformity. Normal range of motion.      Cervical back: " Normal range of motion and neck supple.      Lumbar back: Normal. No spasms.      Right lower leg: No edema.      Left lower leg: No edema.      Comments: 90 degree flexion at the waist.  5/5 strength noted in the bilateral upper and lower extremities.     Lymphadenopathy:      Cervical: No cervical adenopathy.   Skin:     General: Skin is warm and dry.      Coloration: Skin is not jaundiced.      Findings: No rash.   Neurological:      Mental Status: She is alert and oriented to person, place, and time.      Cranial Nerves: No cranial nerve deficit.      Coordination: Coordination normal.      Gait: Gait normal.   Psychiatric:         Behavior: Behavior normal.         Thought Content: Thought content normal.         Judgment: Judgment normal.         Assessment:       1. Type 2 diabetes mellitus without complication, without long-term current use of insulin    2. Gastroesophageal reflux disease without esophagitis    3. Need for pneumococcal vaccine    4. Need for influenza vaccination    5. Special screening for malignant neoplasms, colon    6. Essential hypertension    7. Acquired hypothyroidism           Plan:       Type 2 diabetes mellitus without complication, without long-term current use of insulin  POCT A1c today: 6.2%.  Patient would like to start Ozempic for weight loss - start Ozempic 0.25 mg every 7 days x 2 weeks, then increase 0.5 mg every 7 days.  Continue current treatment regimen as is.  Follow-up in 3 months with repeat hemoglobin A1c.  -     POCT HEMOGLOBIN A1C  -     semaglutide (OZEMPIC) 0.25 mg or 0.5 mg(2 mg/1.5 mL) pen injector; Inject 0.25 mg into the skin every 7 days for 2 weeks, then increase to 0.5 mg into the skin every 7 days.  Dispense: 1 pen; Refill: 3    Gastroesophageal reflux disease without esophagitis  Stop Omeprazole, start Protonix 20 mg q.d..  Patient made aware that Protonix is in the same class (PPI) as Nexium, and has a potential to cause hives.  Patient voiced her  understanding, and elected to proceed.  If hives are experience, contact the office or present to the ER immediately.  -     pantoprazole (PROTONIX) 20 MG tablet; Take 1 tablet (20 mg total) by mouth once daily.  Dispense: 90 tablet; Refill: 1    Need for pneumococcal vaccine  Prevnar 20 administered today.  -     (In Office Administered) Pneumococcal Conjugate Vaccine (20 Valent) (IM)    Need for influenza vaccination  Patient subjectively reports receiving the influenza vaccine recently at a local pharmacy.  Vaccine record will be requested today.    Special screening for malignant neoplasms, colon  Patient denied colonoscopy, but is amenable to Cologuard.  Amb ref for Cologuard sent today.  -     Cologuard Screening (Multitarget Stool DNA); Future; Expected date: 12/27/2022    Essential hypertension  Stable and well controlled.  Continue as is.    Acquired hypothyroidism  Recent TSH (12/05/2022): 1.590.    Continue Levothyroxine 75 mcg q.d.    Lab work recently completed with Cardiology and records were provided today.  Lab work reviewed with patient in office.    Follow up in about 3 months (around 3/27/2023) for Diabetic Check-Up.        12/27/2022 Alex Ford PA-C

## 2023-01-14 LAB — NONINV COLON CA DNA+OCC BLD SCRN STL QL: NEGATIVE

## 2023-01-17 ENCOUNTER — TELEPHONE (OUTPATIENT)
Dept: FAMILY MEDICINE | Facility: CLINIC | Age: 73
End: 2023-01-17

## 2023-01-17 NOTE — TELEPHONE ENCOUNTER
----- Message from DONALD Singh sent at 1/17/2023  6:56 AM CST -----  Please contact patient and inform her that her Cologuard is negative.  Repeat in 3 years.

## 2023-01-31 RX ORDER — ORAL SEMAGLUTIDE 7 MG/1
7 TABLET ORAL DAILY
Qty: 30 TABLET | Refills: 2 | Status: SHIPPED | OUTPATIENT
Start: 2023-01-31 | End: 2023-02-14

## 2023-01-31 NOTE — TELEPHONE ENCOUNTER
"Per Dr. Fiore "we could try Rybelsus 7mg po q day #30 2 refills." Spoke with patient and let her know.   "

## 2023-02-14 DIAGNOSIS — E11.9 TYPE 2 DIABETES MELLITUS WITHOUT COMPLICATION, WITHOUT LONG-TERM CURRENT USE OF INSULIN: Primary | ICD-10-CM

## 2023-02-14 RX ORDER — SEMAGLUTIDE 1.34 MG/ML
0.5 INJECTION, SOLUTION SUBCUTANEOUS
Qty: 3 PEN | Refills: 1 | Status: SHIPPED | OUTPATIENT
Start: 2023-02-14 | End: 2023-10-18

## 2023-02-14 NOTE — TELEPHONE ENCOUNTER
Spoke with patient who states she started taking the Rybelsus and it is making her very depressed and  nauseated. She is wanting us to send over the Ozempic again to [facility]. It is on back order but they are suppose to be getting it early March. She states her blood sugar has been wnl and she is going ot keep a close eye on it and notify us of what it is running if she is unable to get the Ozempic in time.

## 2023-02-14 NOTE — TELEPHONE ENCOUNTER
----- Message from Regina Hylton sent at 2/14/2023  2:37 PM CST -----  - pt would like to talk to nurse about ozempic   [phone]

## 2023-03-17 ENCOUNTER — TELEPHONE (OUTPATIENT)
Dept: FAMILY MEDICINE | Facility: CLINIC | Age: 73
End: 2023-03-17

## 2023-03-17 DIAGNOSIS — I10 ESSENTIAL HYPERTENSION: ICD-10-CM

## 2023-03-17 DIAGNOSIS — Z79.899 ENCOUNTER FOR LONG-TERM (CURRENT) USE OF OTHER MEDICATIONS: Primary | ICD-10-CM

## 2023-03-17 DIAGNOSIS — E11.9 TYPE 2 DIABETES MELLITUS WITHOUT COMPLICATION, WITHOUT LONG-TERM CURRENT USE OF INSULIN: ICD-10-CM

## 2023-03-17 DIAGNOSIS — E03.9 ACQUIRED HYPOTHYROIDISM: ICD-10-CM

## 2023-03-24 DIAGNOSIS — N39.0 URINARY TRACT INFECTION WITHOUT HEMATURIA, SITE UNSPECIFIED: Primary | ICD-10-CM

## 2023-03-24 LAB
ALBUMIN SERPL-MCNC: 4.5 G/DL (ref 3.6–5.1)
ALBUMIN/CREAT UR: 9 MCG/MG CREAT
ALBUMIN/GLOB SERPL: 1.8 (CALC) (ref 1–2.5)
ALP SERPL-CCNC: 49 U/L (ref 37–153)
ALT SERPL-CCNC: 19 U/L (ref 6–29)
APPEARANCE UR: CLEAR
AST SERPL-CCNC: 15 U/L (ref 10–35)
BACTERIA #/AREA URNS HPF: ABNORMAL /HPF
BACTERIA UR CULT: ABNORMAL
BACTERIA UR CULT: ABNORMAL
BASOPHILS # BLD AUTO: 114 CELLS/UL (ref 0–200)
BASOPHILS NFR BLD AUTO: 1.3 %
BILIRUB SERPL-MCNC: 0.6 MG/DL (ref 0.2–1.2)
BILIRUB UR QL STRIP: NEGATIVE
BUN SERPL-MCNC: 27 MG/DL (ref 7–25)
BUN/CREAT SERPL: 33 (CALC) (ref 6–22)
CALCIUM SERPL-MCNC: 9.7 MG/DL (ref 8.6–10.4)
CAOX CRY #/AREA URNS HPF: ABNORMAL /HPF
CHLORIDE SERPL-SCNC: 103 MMOL/L (ref 98–110)
CHOLEST SERPL-MCNC: 162 MG/DL
CHOLEST/HDLC SERPL: 2.9 (CALC)
CO2 SERPL-SCNC: 26 MMOL/L (ref 20–32)
COLOR UR: YELLOW
CREAT SERPL-MCNC: 0.81 MG/DL (ref 0.6–1)
CREAT UR-MCNC: 158 MG/DL (ref 20–275)
EGFR: 76 ML/MIN/1.73M2
EOSINOPHIL # BLD AUTO: 493 CELLS/UL (ref 15–500)
EOSINOPHIL NFR BLD AUTO: 5.6 %
ERYTHROCYTE [DISTWIDTH] IN BLOOD BY AUTOMATED COUNT: 12.4 % (ref 11–15)
GLOBULIN SER CALC-MCNC: 2.5 G/DL (CALC) (ref 1.9–3.7)
GLUCOSE SERPL-MCNC: 112 MG/DL (ref 65–99)
GLUCOSE UR QL STRIP: ABNORMAL
HBA1C MFR BLD: 5.6 % OF TOTAL HGB
HCT VFR BLD AUTO: 46.1 % (ref 35–45)
HDLC SERPL-MCNC: 55 MG/DL
HGB BLD-MCNC: 15.1 G/DL (ref 11.7–15.5)
HGB UR QL STRIP: NEGATIVE
HYALINE CASTS #/AREA URNS LPF: ABNORMAL /LPF
KETONES UR QL STRIP: ABNORMAL
LDLC SERPL CALC-MCNC: 78 MG/DL (CALC)
LEUKOCYTE ESTERASE UR QL STRIP: ABNORMAL
LYMPHOCYTES # BLD AUTO: 4022 CELLS/UL (ref 850–3900)
LYMPHOCYTES NFR BLD AUTO: 45.7 %
MCH RBC QN AUTO: 30.4 PG (ref 27–33)
MCHC RBC AUTO-ENTMCNC: 32.8 G/DL (ref 32–36)
MCV RBC AUTO: 92.9 FL (ref 80–100)
MICROALBUMIN UR-MCNC: 1.4 MG/DL
MONOCYTES # BLD AUTO: 880 CELLS/UL (ref 200–950)
MONOCYTES NFR BLD AUTO: 10 %
NEUTROPHILS # BLD AUTO: 3291 CELLS/UL (ref 1500–7800)
NEUTROPHILS NFR BLD AUTO: 37.4 %
NITRITE UR QL STRIP: POSITIVE
NONHDLC SERPL-MCNC: 107 MG/DL (CALC)
PH UR STRIP: 5.5 [PH] (ref 5–8)
PLATELET # BLD AUTO: 247 THOUSAND/UL (ref 140–400)
PMV BLD REES-ECKER: 10.9 FL (ref 7.5–12.5)
POTASSIUM SERPL-SCNC: 4.3 MMOL/L (ref 3.5–5.3)
PROT SERPL-MCNC: 7 G/DL (ref 6.1–8.1)
PROT UR QL STRIP: NEGATIVE
RBC # BLD AUTO: 4.96 MILLION/UL (ref 3.8–5.1)
RBC #/AREA URNS HPF: ABNORMAL /HPF
SERVICE CMNT-IMP: ABNORMAL
SODIUM SERPL-SCNC: 140 MMOL/L (ref 135–146)
SP GR UR STRIP: 1.03 (ref 1–1.03)
SQUAMOUS #/AREA URNS HPF: ABNORMAL /HPF
TRIGL SERPL-MCNC: 192 MG/DL
TSH SERPL-ACNC: 1.1 MIU/L (ref 0.4–4.5)
WBC # BLD AUTO: 8.8 THOUSAND/UL (ref 3.8–10.8)
WBC #/AREA URNS HPF: ABNORMAL /HPF

## 2023-03-24 RX ORDER — NITROFURANTOIN 25; 75 MG/1; MG/1
100 CAPSULE ORAL 2 TIMES DAILY
Qty: 10 CAPSULE | Refills: 0 | Status: SHIPPED | OUTPATIENT
Start: 2023-03-24

## 2023-03-24 NOTE — TELEPHONE ENCOUNTER
Spoke to pt and she stated she is having UTI symptoms. Pt stated the symptoms started yesterday with Discomfort while urinating     Pharmacy [facility] on [address]

## 2023-03-27 ENCOUNTER — OFFICE VISIT (OUTPATIENT)
Dept: FAMILY MEDICINE | Facility: CLINIC | Age: 73
End: 2023-03-27
Payer: MEDICARE

## 2023-03-27 VITALS
SYSTOLIC BLOOD PRESSURE: 120 MMHG | DIASTOLIC BLOOD PRESSURE: 80 MMHG | WEIGHT: 166 LBS | HEART RATE: 60 BPM | HEIGHT: 61 IN | BODY MASS INDEX: 31.34 KG/M2

## 2023-03-27 DIAGNOSIS — F17.200 SMOKER: ICD-10-CM

## 2023-03-27 DIAGNOSIS — M47.817 LUMBOSACRAL SPONDYLOSIS WITHOUT MYELOPATHY: ICD-10-CM

## 2023-03-27 DIAGNOSIS — Z78.0 MENOPAUSE: ICD-10-CM

## 2023-03-27 DIAGNOSIS — E03.9 ACQUIRED HYPOTHYROIDISM: ICD-10-CM

## 2023-03-27 DIAGNOSIS — M85.851 OSTEOPENIA OF BOTH HIPS: ICD-10-CM

## 2023-03-27 DIAGNOSIS — I10 ESSENTIAL HYPERTENSION: ICD-10-CM

## 2023-03-27 DIAGNOSIS — K21.9 GASTROESOPHAGEAL REFLUX DISEASE WITHOUT ESOPHAGITIS: ICD-10-CM

## 2023-03-27 DIAGNOSIS — M85.852 OSTEOPENIA OF BOTH HIPS: ICD-10-CM

## 2023-03-27 DIAGNOSIS — E11.9 TYPE 2 DIABETES MELLITUS WITHOUT COMPLICATION, WITHOUT LONG-TERM CURRENT USE OF INSULIN: Primary | ICD-10-CM

## 2023-03-27 DIAGNOSIS — Z12.31 OTHER SCREENING MAMMOGRAM: ICD-10-CM

## 2023-03-27 DIAGNOSIS — J30.1 SEASONAL ALLERGIC RHINITIS DUE TO POLLEN: ICD-10-CM

## 2023-03-27 PROCEDURE — 1101F PR PT FALLS ASSESS DOC 0-1 FALLS W/OUT INJ PAST YR: ICD-10-PCS | Mod: CPTII,S$GLB,, | Performed by: FAMILY MEDICINE

## 2023-03-27 PROCEDURE — 3074F PR MOST RECENT SYSTOLIC BLOOD PRESSURE < 130 MM HG: ICD-10-PCS | Mod: CPTII,S$GLB,, | Performed by: FAMILY MEDICINE

## 2023-03-27 PROCEDURE — 3044F PR MOST RECENT HEMOGLOBIN A1C LEVEL <7.0%: ICD-10-PCS | Mod: CPTII,S$GLB,, | Performed by: FAMILY MEDICINE

## 2023-03-27 PROCEDURE — 3044F HG A1C LEVEL LT 7.0%: CPT | Mod: CPTII,S$GLB,, | Performed by: FAMILY MEDICINE

## 2023-03-27 PROCEDURE — 3066F PR DOCUMENTATION OF TREATMENT FOR NEPHROPATHY: ICD-10-PCS | Mod: CPTII,S$GLB,, | Performed by: FAMILY MEDICINE

## 2023-03-27 PROCEDURE — 3288F FALL RISK ASSESSMENT DOCD: CPT | Mod: CPTII,S$GLB,, | Performed by: FAMILY MEDICINE

## 2023-03-27 PROCEDURE — 3066F NEPHROPATHY DOC TX: CPT | Mod: CPTII,S$GLB,, | Performed by: FAMILY MEDICINE

## 2023-03-27 PROCEDURE — 3288F PR FALLS RISK ASSESSMENT DOCUMENTED: ICD-10-PCS | Mod: CPTII,S$GLB,, | Performed by: FAMILY MEDICINE

## 2023-03-27 PROCEDURE — 3079F PR MOST RECENT DIASTOLIC BLOOD PRESSURE 80-89 MM HG: ICD-10-PCS | Mod: CPTII,S$GLB,, | Performed by: FAMILY MEDICINE

## 2023-03-27 PROCEDURE — 1159F MED LIST DOCD IN RCRD: CPT | Mod: CPTII,S$GLB,, | Performed by: FAMILY MEDICINE

## 2023-03-27 PROCEDURE — 3061F PR NEG MICROALBUMINURIA RESULT DOCUMENTED/REVIEW: ICD-10-PCS | Mod: CPTII,S$GLB,, | Performed by: FAMILY MEDICINE

## 2023-03-27 PROCEDURE — 99214 OFFICE O/P EST MOD 30 MIN: CPT | Mod: S$GLB,,, | Performed by: FAMILY MEDICINE

## 2023-03-27 PROCEDURE — 3074F SYST BP LT 130 MM HG: CPT | Mod: CPTII,S$GLB,, | Performed by: FAMILY MEDICINE

## 2023-03-27 PROCEDURE — 99214 PR OFFICE/OUTPT VISIT, EST, LEVL IV, 30-39 MIN: ICD-10-PCS | Mod: S$GLB,,, | Performed by: FAMILY MEDICINE

## 2023-03-27 PROCEDURE — 3079F DIAST BP 80-89 MM HG: CPT | Mod: CPTII,S$GLB,, | Performed by: FAMILY MEDICINE

## 2023-03-27 PROCEDURE — 3008F PR BODY MASS INDEX (BMI) DOCUMENTED: ICD-10-PCS | Mod: CPTII,S$GLB,, | Performed by: FAMILY MEDICINE

## 2023-03-27 PROCEDURE — 1159F PR MEDICATION LIST DOCUMENTED IN MEDICAL RECORD: ICD-10-PCS | Mod: CPTII,S$GLB,, | Performed by: FAMILY MEDICINE

## 2023-03-27 PROCEDURE — 1101F PT FALLS ASSESS-DOCD LE1/YR: CPT | Mod: CPTII,S$GLB,, | Performed by: FAMILY MEDICINE

## 2023-03-27 PROCEDURE — 3008F BODY MASS INDEX DOCD: CPT | Mod: CPTII,S$GLB,, | Performed by: FAMILY MEDICINE

## 2023-03-27 PROCEDURE — 3061F NEG MICROALBUMINURIA REV: CPT | Mod: CPTII,S$GLB,, | Performed by: FAMILY MEDICINE

## 2023-03-27 RX ORDER — DAPAGLIFLOZIN 10 MG/1
10 TABLET, FILM COATED ORAL DAILY
Qty: 30 TABLET | Refills: 0 | Status: CANCELLED | OUTPATIENT
Start: 2023-03-27

## 2023-03-27 RX ORDER — FAMOTIDINE 20 MG/1
20 TABLET, FILM COATED ORAL NIGHTLY PRN
Qty: 90 TABLET | Refills: 1 | Status: SHIPPED | OUTPATIENT
Start: 2023-03-27 | End: 2023-09-11 | Stop reason: SDUPTHER

## 2023-03-27 RX ORDER — LEVOTHYROXINE SODIUM 75 UG/1
75 TABLET ORAL
Qty: 90 TABLET | Refills: 3 | Status: SHIPPED | OUTPATIENT
Start: 2023-03-27

## 2023-03-27 RX ORDER — PANTOPRAZOLE SODIUM 20 MG/1
20 TABLET, DELAYED RELEASE ORAL DAILY
Qty: 90 TABLET | Refills: 3 | Status: SHIPPED | OUTPATIENT
Start: 2023-03-27 | End: 2024-03-26

## 2023-03-27 NOTE — MEDICAL/APP STUDENT
Subjective:       Patient ID: Cora Larry is a 72 y.o. female.    Chief Complaint: Diabetes (Foot exam ordered, requested Eye exam Dr Soares, declined Mammogram (age), Dexa ordered, Acid reflux, Flu addressed, review Lab-results, need refills, abc )    71 yo F with pmhx of Arthritis, DM, HLD, seasonal allergies presenting for F/u. Pt A1c is well controlled at 5.6 since starting ozempic. She has also lost 8 lbs in this time. She inquired about discontinuing Farxiga due to her good A1c which we will trial. Her blood sugar has been between 99-120s in the morning before breakfast at home. She is also currently on Glipizide and Metformin. Since starting Ozempic she has noticed increased GERD. Pantoprazole has provided some relief. Will trial famotidine at night.     Takes claratin daily for Allergies    Reporting constipation. She takes 1 cap of miralax daily and will still have 2-3 days without a BM. She takes alovera which helps her achieve adequate BM.     Notes occasional hip pain affecting either hip when walking over 1/2 a mile. This resolves after 1 minute without exercise. It does not limit her.     We talked about increasing her water intake from 1-2 glasses per day to 3-4. Denies alcohol. Smokes 5 cigarettes nightly.     Pt TG is elevated at 198. She is interested in trying krill oil. Otherwise lipids controlled on statin medication.     Pt is on day 4/5 for treatment of UTI with nitrofurantoin. She was asymptomatic but the UTI was caught on urinalysis.     Cologaurd last January normal  Mammogram 3/24/22 normal  DEXA 3/23/21 normal   Eye exam - 10/10/22 - mild retinopathy and macular degeneration.    Review of Systems   Constitutional:  Negative for appetite change, chills, fatigue, fever and unexpected weight change.   HENT:  Negative for nasal congestion, ear pain, sore throat and trouble swallowing.    Eyes:  Negative for pain, discharge and visual disturbance.   Respiratory:  Negative for apnea,  cough, shortness of breath and wheezing.    Cardiovascular:  Negative for chest pain, palpitations and leg swelling.   Gastrointestinal:  Positive for constipation and reflux. Negative for abdominal pain, blood in stool, diarrhea, nausea and vomiting.   Endocrine: Negative for heat intolerance, polydipsia and polyuria.   Genitourinary:  Negative for difficulty urinating, dyspareunia, dysuria, frequency, hematuria and menstrual problem.   Musculoskeletal:  Negative for arthralgias, back pain, gait problem, joint swelling and myalgias.   Allergic/Immunologic: Negative for environmental allergies, food allergies and immunocompromised state.   Neurological:  Negative for dizziness, tremors, seizures, numbness and headaches.   Psychiatric/Behavioral:  Negative for behavioral problems, confusion, hallucinations and suicidal ideas. The patient is not nervous/anxious.        Objective:      Physical Exam  Vitals and nursing note reviewed.   Constitutional:       Appearance: She is well-developed.   HENT:      Head: Normocephalic and atraumatic.      Right Ear: External ear normal.      Left Ear: External ear normal.      Nose: Nose normal.   Eyes:      Pupils: Pupils are equal, round, and reactive to light.   Neck:      Thyroid: No thyromegaly.      Vascular: No carotid bruit.   Cardiovascular:      Rate and Rhythm: Normal rate and regular rhythm.      Heart sounds: Normal heart sounds. No murmur heard.  Pulmonary:      Effort: Pulmonary effort is normal.      Breath sounds: Normal breath sounds. No wheezing or rales.   Abdominal:      General: Bowel sounds are normal. There is no distension.      Palpations: Abdomen is soft.      Tenderness: There is no abdominal tenderness.   Musculoskeletal:         General: No tenderness or deformity. Normal range of motion.      Cervical back: Normal range of motion and neck supple.      Lumbar back: Normal. No spasms.      Comments: Bends 90 degrees at  waist   Lymphadenopathy:       Cervical: No cervical adenopathy.   Skin:     General: Skin is warm and dry.      Findings: No rash.   Neurological:      Mental Status: She is alert and oriented to person, place, and time.      Cranial Nerves: No cranial nerve deficit.      Coordination: Coordination normal.   Psychiatric:         Behavior: Behavior normal.         Thought Content: Thought content normal.         Judgment: Judgment normal.       Assessment:       Problem List Items Addressed This Visit          Endocrine    Type 2 diabetes mellitus without complication, without long-term current use of insulin - Primary       GI    Gastroesophageal reflux disease without esophagitis     Other Visit Diagnoses       Menopause                  Plan:

## 2023-03-27 NOTE — LETTER
[address]  [place] [address]  Phone: [phone]   [phone]                        MD Jethro Robison MD Chequita Williams, MD Matthew Bassett, PA-C Linda Melerine, NP Jodi Powell, NP Hunter Reed, PA-C      Date: 03/27/2023        Patient: Cora Larry  YOB: 1950       Please send the most recent Eye exam.        Sincerely,     Alfredo Chapa MA

## 2023-03-28 NOTE — PROGRESS NOTES
SUBJECTIVE:    Patient ID: Cora Larry is a 72 y.o. female.    Chief Complaint: Diabetes (Foot exam ordered, requested Eye exam Dr Soares, declined Mammogram (age), Dexa ordered, Acid reflux, Flu addressed, review Lab-results, need refills, abc )    Patient ID: Cora Larry is a 72 y.o. female.     Chief Complaint: Diabetes (Foot exam ordered, requested Eye exam Dr Soares, declined Mammogram (age), Dexa ordered, Acid reflux, Flu addressed, review Lab-results, need refills, abc )     71 yo F with pmhx of Arthritis, DM, HLD, seasonal allergies presenting for F/u. Pt A1c is well controlled at 5.6 since starting ozempic. She has also lost 8 lbs in this time. She inquired about discontinuing Farxiga due to her good A1c which we will trial. Her blood sugar has been between 99-120s in the morning before breakfast at home. She is also currently on Glipizide and Metformin. Since starting Ozempic she has noticed increased GERD. Pantoprazole has provided some relief. Will trial famotidine at night.      Takes claratin daily for Allergies     Reporting constipation. She takes 1 cap of miralax daily and will still have 2-3 days without a BM. She takes alovera which helps her achieve adequate BM.      Notes occasional hip pain affecting either hip when walking over 1/2 a mile. This resolves after 1 minute without exercise. It does not limit her.      We talked about increasing her water intake from 1-2 glasses per day to 3-4. Denies alcohol. Smokes 5 cigarettes nightly.      Pt TG is elevated at 198. She is interested in trying krill oil. Otherwise lipids controlled on statin medication.      Pt is on day 4/5 for treatment of UTI with nitrofurantoin. She was asymptomatic but the UTI was caught on urinalysis.      Cologaurd last January normal  Mammogram 3/24/22 normal  DEXA 3/23/21 normal   Eye exam - 10/10/22 - mild retinopathy and macular degeneration.     Review of Systems   Constitutional:  Negative for  appetite change, chills, fatigue, fever and unexpected weight change.   HENT:  Negative for nasal congestion, ear pain, sore throat and trouble swallowing.    Eyes:  Negative for pain, discharge and visual disturbance.   Respiratory:  Negative for apnea, cough, shortness of breath and wheezing.    Cardiovascular:  Negative for chest pain, palpitations and leg swelling.   Gastrointestinal:  Positive for constipation and reflux. Negative for abdominal pain, blood in stool, diarrhea, nausea and vomiting.   Endocrine: Negative for heat intolerance, polydipsia and polyuria.   Genitourinary:  Negative for difficulty urinating, dyspareunia, dysuria, frequency, hematuria and menstrual problem.   Musculoskeletal:  Negative for arthralgias, back pain, gait problem, joint swelling and myalgias.   Allergic/Immunologic: Negative for environmental allergies, food allergies and immunocompromised state.   Neurological:  Negative for dizziness, tremors, seizures, numbness and headaches.   Psychiatric/Behavioral:  Negative for behavioral problems, confusion, hallucinations and suicidal ideas. The patient is not nervous/anxious.        Objective:  Physical Exam  Vitals and nursing note reviewed.   Constitutional:       Appearance: She is well-developed.   HENT:      Head: Normocephalic and atraumatic.      Right Ear: External ear normal.      Left Ear: External ear normal.      Nose: Nose normal.   Eyes:      Pupils: Pupils are equal, round, and reactive to light.   Neck:      Thyroid: No thyromegaly.      Vascular: No carotid bruit.   Cardiovascular:      Rate and Rhythm: Normal rate and regular rhythm.      Heart sounds: Normal heart sounds. No murmur heard.  Pulmonary:      Effort: Pulmonary effort is normal.      Breath sounds: Normal breath sounds. No wheezing or rales.   Abdominal:      General: Bowel sounds are normal. There is no distension.      Palpations: Abdomen is soft.      Tenderness: There is no abdominal tenderness.    Musculoskeletal:         General: No tenderness or deformity. Normal range of motion.      Cervical back: Normal range of motion and neck supple.      Lumbar back: Normal. No spasms.      Comments: Bends 90 degrees at  waist   Lymphadenopathy:      Cervical: No cervical adenopathy.   Skin:     General: Skin is warm and dry.      Findings: No rash.   Neurological:      Mental Status: She is alert and oriented to person, place, and time.      Cranial Nerves: No cranial nerve deficit.      Coordination: Coordination normal.   Psychiatric:         Behavior: Behavior normal.         Thought Content: Thought content normal.         Judgment: Judgment normal.           Telephone on 03/17/2023   Component Date Value Ref Range Status    WBC 03/21/2023 8.8  3.8 - 10.8 Thousand/uL Final    RBC 03/21/2023 4.96  3.80 - 5.10 Million/uL Final    Hemoglobin 03/21/2023 15.1  11.7 - 15.5 g/dL Final    Hematocrit 03/21/2023 46.1 (H)  35.0 - 45.0 % Final    MCV 03/21/2023 92.9  80.0 - 100.0 fL Final    MCH 03/21/2023 30.4  27.0 - 33.0 pg Final    MCHC 03/21/2023 32.8  32.0 - 36.0 g/dL Final    RDW 03/21/2023 12.4  11.0 - 15.0 % Final    Platelets 03/21/2023 247  140 - 400 Thousand/uL Final    MPV 03/21/2023 10.9  7.5 - 12.5 fL Final    Neutrophils, Abs 03/21/2023 3,291  1,500 - 7,800 cells/uL Final    Lymph # 03/21/2023 4,022 (H)  850 - 3,900 cells/uL Final    Mono # 03/21/2023 880  200 - 950 cells/uL Final    Eos # 03/21/2023 493  15 - 500 cells/uL Final    Baso # 03/21/2023 114  0 - 200 cells/uL Final    Neutrophils Relative 03/21/2023 37.4  % Final    Lymph % 03/21/2023 45.7  % Final    Mono % 03/21/2023 10.0  % Final    Eosinophil % 03/21/2023 5.6  % Final    Basophil % 03/21/2023 1.3  % Final    Glucose 03/21/2023 112 (H)  65 - 99 mg/dL Final    BUN 03/21/2023 27 (H)  7 - 25 mg/dL Final    Creatinine 03/21/2023 0.81  0.60 - 1.00 mg/dL Final    eGFR 03/21/2023 76  > OR = 60 mL/min/1.73m2 Final    BUN/Creatinine Ratio 03/21/2023  33 (H)  6 - 22 (calc) Final    Sodium 03/21/2023 140  135 - 146 mmol/L Final    Potassium 03/21/2023 4.3  3.5 - 5.3 mmol/L Final    Chloride 03/21/2023 103  98 - 110 mmol/L Final    CO2 03/21/2023 26  20 - 32 mmol/L Final    Calcium 03/21/2023 9.7  8.6 - 10.4 mg/dL Final    Total Protein 03/21/2023 7.0  6.1 - 8.1 g/dL Final    Albumin 03/21/2023 4.5  3.6 - 5.1 g/dL Final    Globulin, Total 03/21/2023 2.5  1.9 - 3.7 g/dL (calc) Final    Albumin/Globulin Ratio 03/21/2023 1.8  1.0 - 2.5 (calc) Final    Total Bilirubin 03/21/2023 0.6  0.2 - 1.2 mg/dL Final    Alkaline Phosphatase 03/21/2023 49  37 - 153 U/L Final    AST 03/21/2023 15  10 - 35 U/L Final    ALT 03/21/2023 19  6 - 29 U/L Final    Cholesterol 03/21/2023 162  <200 mg/dL Final    HDL 03/21/2023 55  > OR = 50 mg/dL Final    Triglycerides 03/21/2023 192 (H)  <150 mg/dL Final    LDL Cholesterol 03/21/2023 78  mg/dL (calc) Final    HDL/Cholesterol Ratio 03/21/2023 2.9  <5.0 (calc) Final    Non HDL Chol. (LDL+VLDL) 03/21/2023 107  <130 mg/dL (calc) Final    Creatinine, Urine 03/21/2023 158  20 - 275 mg/dL Final    Microalb, Ur 03/21/2023 1.4  See Note: mg/dL Final    Microalb/Creat Ratio 03/21/2023 9  <30 mcg/mg creat Final    Color, UA 03/21/2023 YELLOW  YELLOW Final    Appearance, UA 03/21/2023 CLEAR  CLEAR Final    Specific Gravity, UA 03/21/2023 1.032  1.001 - 1.035 Final    pH, UA 03/21/2023 5.5  5.0 - 8.0 Final    Glucose, UA 03/21/2023 3+ (A)  NEGATIVE Final    Bilirubin, UA 03/21/2023 NEGATIVE  NEGATIVE Final    Ketones, UA 03/21/2023 TRACE (A)  NEGATIVE Final    Occult Blood UA 03/21/2023 NEGATIVE  NEGATIVE Final    Protein, UA 03/21/2023 NEGATIVE  NEGATIVE Final    Nitrite, UA 03/21/2023 POSITIVE (A)  NEGATIVE Final    Leukocytes, UA 03/21/2023 1+ (A)  NEGATIVE Final    WBC Casts, UA 03/21/2023 0-5  < OR = 5 /HPF Final    RBC Casts, UA 03/21/2023 NONE SEEN  < OR = 2 /HPF Final    Squam Epithel, UA 03/21/2023 0-5  < OR = 5 /HPF Final    Bacteria, UA  03/21/2023 MANY (A)  NONE SEEN /HPF Final    Ca Oxalate Scarlett, UA 03/21/2023 MANY (A)  NONE OR FEW /HPF Final    Hyaline Casts, UA 03/21/2023 NONE SEEN  NONE SEEN /LPF Final    Service Cmt: 03/21/2023    Final    Reflexive Urine Culture 03/21/2023    Final    Urine Culture, Routine 03/21/2023  (A)   Final    TSH w/reflex to FT4 03/21/2023 1.10  0.40 - 4.50 mIU/L Final    Hemoglobin A1C 03/21/2023 5.6  <5.7 % of total Hgb Final   Office Visit on 12/27/2022   Component Date Value Ref Range Status    Hemoglobin A1C, POC 12/27/2022 6.2  % Final    Cologuard Result 01/09/2023 Negative  Negative Final       Past Medical History:   Diagnosis Date    Arthritis     Back pain     Depression     Diabetes mellitus     Hypercholesteremia     Seasonal allergies      Social History     Socioeconomic History    Marital status:    Tobacco Use    Smoking status: Some Days     Packs/day: 0.25     Years: 57.00     Pack years: 14.25     Types: Cigarettes    Smokeless tobacco: Never   Substance and Sexual Activity    Alcohol use: Never     Alcohol/week: 1.0 standard drink     Types: 1 Glasses of wine per week     Comment: 2 times per month    Drug use: Never    Sexual activity: Not Currently     Partners: Male     Birth control/protection: Partner-Vasectomy     Past Surgical History:   Procedure Laterality Date    CHOLECYSTECTOMY      COSMETIC SURGERY      lucero      dec 2011    EYE SURGERY       Family History   Problem Relation Age of Onset    Breast cancer Sister     Breast cancer Maternal Aunt     Asthma Sister     Cancer Sister     Diabetes Sister     Heart disease Sister     Hyperlipidemia Sister     Hypertension Sister        Review of patient's allergies indicates:   Allergen Reactions    Nexium [esomeprazole magnesium] Hives    Lorabid [loracarbef] Hives    Sulfa (sulfonamide antibiotics) Hives       Current Outpatient Medications:     ALPRAZolam (XANAX) 2 MG Tab, Take 2 mg by mouth once daily., Disp: , Rfl:     amLODIPine  (NORVASC) 5 MG tablet, Take 1 tablet by mouth once daily., Disp: , Rfl:     ascorbic acid, vitamin C, (VITAMIN C) 1000 MG tablet, , Disp: , Rfl:     aspirin (ECOTRIN) 81 MG EC tablet, Take 81 mg by mouth once daily., Disp: , Rfl:     blood sugar diagnostic Strp, To check BG 1 times daily, to use with insurance preferred meter, Disp: 100 strip, Rfl: 1    blood-glucose meter kit, To check BG 1 times daily, to use with insurance preferred meter, Disp: 1 each, Rfl: 0    BYSTOLIC 5 mg Tab, Take 1 tablet by mouth once daily., Disp: , Rfl:     docusate sodium (COLACE) 100 MG capsule, , Disp: , Rfl:     FARXIGA 10 mg tablet, Take 1 tablet (10 mg total) by mouth once daily., Disp: 30 tablet, Rfl: 0    glipiZIDE (GLUCOTROL) 5 MG tablet, Take 1/2 tablet every morning, Disp: 30 tablet, Rfl: 5    lancets Misc, To check BG 1 times daily, to use with insurance preferred meter, Disp: 100 each, Rfl: 1    metFORMIN (GLUCOPHAGE) 500 MG tablet, Take 1,000 mg by mouth 2 (two) times daily with meals., Disp: , Rfl:     omega-3s/dha/epa/fish oil/D3 (VITAMIN-D + OMEGA-3 ORAL), , Disp: , Rfl:     PROAIR HFA 90 mcg/actuation inhaler, , Disp: , Rfl:     rosuvastatin (CRESTOR) 10 MG tablet, Take 10 mg by mouth once daily., Disp: , Rfl:     semaglutide (OZEMPIC) 0.25 mg or 0.5 mg(2 mg/1.5 mL) pen injector, Inject 0.5 mg into the skin every 7 days., Disp: 3 pen, Rfl: 1    zinc acetate 50 mg (zinc) Cap, , Disp: , Rfl:     zolpidem (AMBIEN) 10 mg Tab, Take 10 mg by mouth nightly as needed., Disp: , Rfl:     famotidine (PEPCID) 20 MG tablet, Take 1 tablet (20 mg total) by mouth nightly as needed for Heartburn., Disp: 90 tablet, Rfl: 1    levothyroxine (EUTHYROX) 75 MCG tablet, Take 1 tablet (75 mcg total) by mouth before breakfast., Disp: 90 tablet, Rfl: 3    nitrofurantoin, macrocrystal-monohydrate, (MACROBID) 100 MG capsule, Take 1 capsule (100 mg total) by mouth 2 (two) times daily., Disp: 10 capsule, Rfl: 0    pantoprazole (PROTONIX) 20 MG  "tablet, Take 1 tablet (20 mg total) by mouth once daily., Disp: 90 tablet, Rfl: 3  No current facility-administered medications for this visit.    Facility-Administered Medications Ordered in Other Visits:     betamethasone acetate-betamethasone sodium phosphate injection, , , PRN, Ruddy Serrano MD, 3 mL at 04/12/12 1002    bupivacaine (PF) 0.25 % (2.5 mg/mL) injection, , , PRN, Ruddy Serrano MD, 3 mL at 04/12/12 1002    iopamidol 61 % intrathecal injection, , , PRN, Ruddy Serrano MD, 3 mL at 04/12/12 1001    lidocaine (PF) 10 mg/mL (1 %) injection, , , PRN, Ruddy Serrano MD, 10 mL at 04/12/12 0959    Review of Systems        Objective:      Vitals:    03/27/23 1509   BP: 120/80   Pulse: 60   Weight: 75.3 kg (166 lb)   Height: 5' 1" (1.549 m)     Physical Exam      Assessment:       1. Type 2 diabetes mellitus without complication, without long-term current use of insulin    2. Menopause    3. Gastroesophageal reflux disease without esophagitis    4. Smoker    5. Other screening mammogram    6. Acquired hypothyroidism    7. Lumbosacral spondylosis without myelopathy    8. Seasonal allergic rhinitis due to pollen    9. Essential hypertension    10. Osteopenia of both hips           Plan:       Type 2 diabetes mellitus without complication, without long-term current use of insulin  -     Foot Exam Performed  A1c down to 5.6.  As she is doing so well with the Ozempic with weight loss and diabetic control, we will discontinue Farxiga.  She will continue to monitor her blood sugars continue glipizide and metformin also.  Menopause  -     DXA Bone Density Axial Skeleton 1 or more sites; Future; Expected date: 03/27/2023  Due for DEXA scan this year  Gastroesophageal reflux disease without esophagitis  -     pantoprazole (PROTONIX) 20 MG tablet; Take 1 tablet (20 mg total) by mouth once daily.  Dispense: 90 tablet; Refill: 3  Add famotidine at night to help with GERD  Smoker  -     X-Ray Chest PA " And Lateral; Future  Surveillance chest x-ray ordered  Other screening mammogram  Will need screening mammogram again this year  Acquired hypothyroidism  -     levothyroxine (EUTHYROX) 75 MCG tablet; Take 1 tablet (75 mcg total) by mouth before breakfast.  Dispense: 90 tablet; Refill: 3    Lumbosacral spondylosis without myelopathy    Seasonal allergic rhinitis due to pollen    Essential hypertension  Blood pressure well controlled currently  Osteopenia of both hips      Follow up in about 6 months (around 9/27/2023) for Diabetic Check-Up.        3/27/2023 Ruddy Fiore

## 2023-04-18 ENCOUNTER — HOSPITAL ENCOUNTER (OUTPATIENT)
Dept: RADIOLOGY | Facility: HOSPITAL | Age: 73
Discharge: HOME | End: 2023-04-18
Attending: FAMILY MEDICINE
Payer: MEDICARE

## 2023-04-18 DIAGNOSIS — Z78.0 MENOPAUSE: ICD-10-CM

## 2023-04-18 DIAGNOSIS — Z12.31 OTHER SCREENING MAMMOGRAM: ICD-10-CM

## 2023-04-18 DIAGNOSIS — F17.200 SMOKER: ICD-10-CM

## 2023-04-18 PROCEDURE — 77067 SCR MAMMO BI INCL CAD: CPT | Mod: TC,PO

## 2023-04-18 PROCEDURE — 71046 X-RAY EXAM CHEST 2 VIEWS: CPT | Mod: TC,PO

## 2023-04-18 PROCEDURE — 77080 DXA BONE DENSITY AXIAL: CPT | Mod: TC,PO

## 2023-04-19 ENCOUNTER — TELEPHONE (OUTPATIENT)
Dept: FAMILY MEDICINE | Facility: CLINIC | Age: 73
End: 2023-04-19

## 2023-04-19 NOTE — TELEPHONE ENCOUNTER
----- Message from Ruddy Fiore MD sent at 4/19/2023  7:49 AM CDT -----  Call patient.  Mammogram was normal.  Repeat mammogram in 1 year.

## 2023-04-19 NOTE — TELEPHONE ENCOUNTER
----- Message from Ruddy Fiore MD sent at 4/19/2023  7:50 AM CDT -----  Call patient.  DEXA scan shows normal bone density and hips and spine.  Continue calcium plus D vitamins

## 2023-04-19 NOTE — PROGRESS NOTES
Call patient.  DEXA scan shows normal bone density and hips and spine.  Continue calcium plus D vitamins

## 2023-04-24 ENCOUNTER — TELEPHONE (OUTPATIENT)
Dept: FAMILY MEDICINE | Facility: CLINIC | Age: 73
End: 2023-04-24

## 2023-04-24 NOTE — TELEPHONE ENCOUNTER
----- Message from Ruddy Fiore MD sent at 4/22/2023  4:12 PM CDT -----  Call patient.  Chest x-ray looked entirely normal.  Continue as is

## 2023-06-13 DIAGNOSIS — E11.9 TYPE 2 DIABETES MELLITUS WITHOUT COMPLICATION, WITHOUT LONG-TERM CURRENT USE OF INSULIN: ICD-10-CM

## 2023-06-14 RX ORDER — GLIPIZIDE 5 MG/1
2.5 TABLET ORAL
Qty: 45 TABLET | Refills: 3 | Status: SHIPPED | OUTPATIENT
Start: 2023-06-14

## 2023-09-11 DIAGNOSIS — K21.9 GASTROESOPHAGEAL REFLUX DISEASE WITHOUT ESOPHAGITIS: ICD-10-CM

## 2023-09-11 RX ORDER — FAMOTIDINE 20 MG/1
20 TABLET, FILM COATED ORAL NIGHTLY PRN
Qty: 90 TABLET | Refills: 1 | Status: SHIPPED | OUTPATIENT
Start: 2023-09-11 | End: 2024-09-10

## 2023-09-11 NOTE — TELEPHONE ENCOUNTER
Spoke with patient who states [facility] out to us to refill her Famotidine. She needs a refill, I let her know we do not receive electronic faxes from the pharmacy. Rx will be sent today.

## 2023-09-11 NOTE — TELEPHONE ENCOUNTER
----- Message from Regina Hylton sent at 9/11/2023  3:00 PM CDT -----  - pt is calling a refill that [facility] never received an answer from us on   [phone]

## 2023-09-27 ENCOUNTER — TELEPHONE (OUTPATIENT)
Dept: FAMILY MEDICINE | Facility: CLINIC | Age: 73
End: 2023-09-27

## 2023-09-27 DIAGNOSIS — Z79.899 ENCOUNTER FOR LONG-TERM (CURRENT) USE OF OTHER MEDICATIONS: Primary | ICD-10-CM

## 2023-09-27 DIAGNOSIS — E03.9 ACQUIRED HYPOTHYROIDISM: ICD-10-CM

## 2023-09-27 DIAGNOSIS — I10 ESSENTIAL HYPERTENSION: ICD-10-CM

## 2023-09-27 DIAGNOSIS — E11.9 TYPE 2 DIABETES MELLITUS WITHOUT COMPLICATION, WITHOUT LONG-TERM CURRENT USE OF INSULIN: ICD-10-CM

## 2023-09-27 NOTE — TELEPHONE ENCOUNTER
----- Message from Regina Hylton sent at 9/27/2023 12:01 PM CDT -----  Vm- 12:00-pt has an appt next week and needs to know if she needs labs   [phone]

## 2023-09-29 LAB
ALBUMIN SERPL-MCNC: 4.5 G/DL (ref 3.8–4.8)
ALBUMIN/GLOB SERPL: 2.4 {RATIO} (ref 1.2–2.2)
ALP SERPL-CCNC: 50 IU/L (ref 44–121)
ALT SERPL-CCNC: 20 IU/L (ref 0–32)
AST SERPL-CCNC: 16 IU/L (ref 0–40)
BILIRUB SERPL-MCNC: 0.3 MG/DL (ref 0–1.2)
BUN SERPL-MCNC: 22 MG/DL (ref 8–27)
BUN/CREAT SERPL: 31 (ref 12–28)
CALCIUM SERPL-MCNC: 10 MG/DL (ref 8.7–10.3)
CHLORIDE SERPL-SCNC: 105 MMOL/L (ref 96–106)
CHOLEST SERPL-MCNC: 161 MG/DL (ref 100–199)
CO2 SERPL-SCNC: 23 MMOL/L (ref 20–29)
CREAT SERPL-MCNC: 0.71 MG/DL (ref 0.57–1)
EST. GFR  (NO RACE VARIABLE): 89 ML/MIN/1.73
GLOBULIN SER CALC-MCNC: 1.9 G/DL (ref 1.5–4.5)
GLUCOSE SERPL-MCNC: 128 MG/DL (ref 70–99)
HBA1C MFR BLD: 5.6 % (ref 4.8–5.6)
HDLC SERPL-MCNC: 60 MG/DL
LDLC SERPL CALC-MCNC: 72 MG/DL (ref 0–99)
POTASSIUM SERPL-SCNC: 4.7 MMOL/L (ref 3.5–5.2)
PROT SERPL-MCNC: 6.4 G/DL (ref 6–8.5)
SODIUM SERPL-SCNC: 140 MMOL/L (ref 134–144)
TRIGL SERPL-MCNC: 175 MG/DL (ref 0–149)
VLDLC SERPL CALC-MCNC: 29 MG/DL (ref 5–40)

## 2023-10-02 ENCOUNTER — OFFICE VISIT (OUTPATIENT)
Dept: FAMILY MEDICINE | Facility: CLINIC | Age: 73
End: 2023-10-02
Attending: FAMILY MEDICINE
Payer: MEDICARE

## 2023-10-02 VITALS
DIASTOLIC BLOOD PRESSURE: 74 MMHG | SYSTOLIC BLOOD PRESSURE: 126 MMHG | HEIGHT: 61 IN | BODY MASS INDEX: 30.02 KG/M2 | HEART RATE: 87 BPM | WEIGHT: 159 LBS

## 2023-10-02 DIAGNOSIS — M85.851 OSTEOPENIA OF BOTH HIPS: ICD-10-CM

## 2023-10-02 DIAGNOSIS — J30.1 SEASONAL ALLERGIC RHINITIS DUE TO POLLEN: ICD-10-CM

## 2023-10-02 DIAGNOSIS — M47.817 LUMBOSACRAL SPONDYLOSIS WITHOUT MYELOPATHY: ICD-10-CM

## 2023-10-02 DIAGNOSIS — K21.9 GASTROESOPHAGEAL REFLUX DISEASE WITHOUT ESOPHAGITIS: ICD-10-CM

## 2023-10-02 DIAGNOSIS — H61.23 HEARING LOSS DUE TO CERUMEN IMPACTION, BILATERAL: ICD-10-CM

## 2023-10-02 DIAGNOSIS — E11.9 TYPE 2 DIABETES MELLITUS WITHOUT COMPLICATION, WITHOUT LONG-TERM CURRENT USE OF INSULIN: Primary | ICD-10-CM

## 2023-10-02 DIAGNOSIS — E03.9 ACQUIRED HYPOTHYROIDISM: ICD-10-CM

## 2023-10-02 DIAGNOSIS — M85.852 OSTEOPENIA OF BOTH HIPS: ICD-10-CM

## 2023-10-02 DIAGNOSIS — I10 ESSENTIAL HYPERTENSION: ICD-10-CM

## 2023-10-02 PROCEDURE — 3078F DIAST BP <80 MM HG: CPT | Mod: CPTII,S$GLB,, | Performed by: FAMILY MEDICINE

## 2023-10-02 PROCEDURE — 3074F PR MOST RECENT SYSTOLIC BLOOD PRESSURE < 130 MM HG: ICD-10-PCS | Mod: CPTII,S$GLB,, | Performed by: FAMILY MEDICINE

## 2023-10-02 PROCEDURE — 3061F NEG MICROALBUMINURIA REV: CPT | Mod: CPTII,S$GLB,, | Performed by: FAMILY MEDICINE

## 2023-10-02 PROCEDURE — 1101F PT FALLS ASSESS-DOCD LE1/YR: CPT | Mod: CPTII,S$GLB,, | Performed by: FAMILY MEDICINE

## 2023-10-02 PROCEDURE — 3044F PR MOST RECENT HEMOGLOBIN A1C LEVEL <7.0%: ICD-10-PCS | Mod: CPTII,S$GLB,, | Performed by: FAMILY MEDICINE

## 2023-10-02 PROCEDURE — 3044F HG A1C LEVEL LT 7.0%: CPT | Mod: CPTII,S$GLB,, | Performed by: FAMILY MEDICINE

## 2023-10-02 PROCEDURE — 3078F PR MOST RECENT DIASTOLIC BLOOD PRESSURE < 80 MM HG: ICD-10-PCS | Mod: CPTII,S$GLB,, | Performed by: FAMILY MEDICINE

## 2023-10-02 PROCEDURE — 1159F MED LIST DOCD IN RCRD: CPT | Mod: CPTII,S$GLB,, | Performed by: FAMILY MEDICINE

## 2023-10-02 PROCEDURE — 3066F PR DOCUMENTATION OF TREATMENT FOR NEPHROPATHY: ICD-10-PCS | Mod: CPTII,S$GLB,, | Performed by: FAMILY MEDICINE

## 2023-10-02 PROCEDURE — 99214 OFFICE O/P EST MOD 30 MIN: CPT | Mod: S$GLB,,, | Performed by: FAMILY MEDICINE

## 2023-10-02 PROCEDURE — 3288F PR FALLS RISK ASSESSMENT DOCUMENTED: ICD-10-PCS | Mod: CPTII,S$GLB,, | Performed by: FAMILY MEDICINE

## 2023-10-02 PROCEDURE — 1101F PR PT FALLS ASSESS DOC 0-1 FALLS W/OUT INJ PAST YR: ICD-10-PCS | Mod: CPTII,S$GLB,, | Performed by: FAMILY MEDICINE

## 2023-10-02 PROCEDURE — 1159F PR MEDICATION LIST DOCUMENTED IN MEDICAL RECORD: ICD-10-PCS | Mod: CPTII,S$GLB,, | Performed by: FAMILY MEDICINE

## 2023-10-02 PROCEDURE — 3008F PR BODY MASS INDEX (BMI) DOCUMENTED: ICD-10-PCS | Mod: CPTII,S$GLB,, | Performed by: FAMILY MEDICINE

## 2023-10-02 PROCEDURE — 99214 PR OFFICE/OUTPT VISIT, EST, LEVL IV, 30-39 MIN: ICD-10-PCS | Mod: S$GLB,,, | Performed by: FAMILY MEDICINE

## 2023-10-02 PROCEDURE — 3066F NEPHROPATHY DOC TX: CPT | Mod: CPTII,S$GLB,, | Performed by: FAMILY MEDICINE

## 2023-10-02 PROCEDURE — 3008F BODY MASS INDEX DOCD: CPT | Mod: CPTII,S$GLB,, | Performed by: FAMILY MEDICINE

## 2023-10-02 PROCEDURE — 3288F FALL RISK ASSESSMENT DOCD: CPT | Mod: CPTII,S$GLB,, | Performed by: FAMILY MEDICINE

## 2023-10-02 PROCEDURE — 3061F PR NEG MICROALBUMINURIA RESULT DOCUMENTED/REVIEW: ICD-10-PCS | Mod: CPTII,S$GLB,, | Performed by: FAMILY MEDICINE

## 2023-10-02 PROCEDURE — 3074F SYST BP LT 130 MM HG: CPT | Mod: CPTII,S$GLB,, | Performed by: FAMILY MEDICINE

## 2023-10-02 RX ORDER — TIRZEPATIDE 2.5 MG/.5ML
2.5 INJECTION, SOLUTION SUBCUTANEOUS
Qty: 4 PEN | Refills: 0 | Status: SHIPPED | OUTPATIENT
Start: 2023-10-02 | End: 2023-10-18

## 2023-10-04 NOTE — PROGRESS NOTES
SUBJECTIVE:    Patient ID: Cora Larry is a 72 y.o. female.    Chief Complaint: Diabetes (No bottles, review Labs results, discuss about medication (side effect),abc)    72-year-old female here for six-month checkup.  She has diabetes and is been on Ozempic for many months.  She has lost over 30 lb this year.  A1c is down to 5.6.    She does state she has increased heartburn and GERD while on Ozempic.  She is now taking pantoprazole/Pepcid/Tums.    Lower back aches from time to time and she takes a Tylenol PM at night.    Sees cardiology Dr. Villa every 4 months.  Her hypertension is well controlled.    2022-Cologuard was negative-RTC 3 years        Telephone on 09/27/2023   Component Date Value Ref Range Status    Glucose 09/28/2023 128 (H)  70 - 99 mg/dL Final    BUN 09/28/2023 22  8 - 27 mg/dL Final    Creatinine 09/28/2023 0.71  0.57 - 1.00 mg/dL Final    eGFR 09/28/2023 89  >59 mL/min/1.73 Final    BUN/Creatinine Ratio 09/28/2023 31 (H)  12 - 28 Final    Sodium 09/28/2023 140  134 - 144 mmol/L Final    Potassium 09/28/2023 4.7  3.5 - 5.2 mmol/L Final    Chloride 09/28/2023 105  96 - 106 mmol/L Final    CO2 09/28/2023 23  20 - 29 mmol/L Final    Calcium 09/28/2023 10.0  8.7 - 10.3 mg/dL Final    Protein, Total 09/28/2023 6.4  6.0 - 8.5 g/dL Final    Albumin 09/28/2023 4.5  3.8 - 4.8 g/dL Final    Globulin, Total 09/28/2023 1.9  1.5 - 4.5 g/dL Final    Albumin/Globulin Ratio 09/28/2023 2.4 (H)  1.2 - 2.2 Final    Total Bilirubin 09/28/2023 0.3  0.0 - 1.2 mg/dL Final    Alkaline Phosphatase 09/28/2023 50  44 - 121 IU/L Final    AST 09/28/2023 16  0 - 40 IU/L Final    ALT 09/28/2023 20  0 - 32 IU/L Final    Cholesterol 09/28/2023 161  100 - 199 mg/dL Final    Triglycerides 09/28/2023 175 (H)  0 - 149 mg/dL Final    HDL 09/28/2023 60  >39 mg/dL Final    VLDL Cholesterol Ector 09/28/2023 29  5 - 40 mg/dL Final    LDL Calculated 09/28/2023 72  0 - 99 mg/dL Final    Hemoglobin A1c 09/28/2023 5.6  4.8 - 5.6  % Final       Past Medical History:   Diagnosis Date    Arthritis     Back pain     Depression     Diabetes mellitus     Hypercholesteremia     Seasonal allergies      Social History     Socioeconomic History    Marital status:    Tobacco Use    Smoking status: Some Days     Current packs/day: 0.25     Average packs/day: 0.3 packs/day for 57.0 years (14.3 ttl pk-yrs)     Types: Cigarettes    Smokeless tobacco: Never   Substance and Sexual Activity    Alcohol use: Never     Alcohol/week: 1.0 standard drink of alcohol     Types: 1 Glasses of wine per week     Comment: 2 times per month    Drug use: Never    Sexual activity: Not Currently     Partners: Male     Birth control/protection: Partner-Vasectomy     Past Surgical History:   Procedure Laterality Date    CHOLECYSTECTOMY      COSMETIC SURGERY      lucero      dec 2011    EYE SURGERY       Family History   Problem Relation Age of Onset    Breast cancer Sister     Breast cancer Maternal Aunt     Asthma Sister     Cancer Sister     Diabetes Sister     Heart disease Sister     Hyperlipidemia Sister     Hypertension Sister        The 10-year CVD risk score (Yessenia et al., 2008) is: 30.1%    Values used to calculate the score:      Age: 72 years      Sex: Female      Diabetic: Yes      Tobacco smoker: Yes      Systolic Blood Pressure: 126 mmHg      Is BP treated: Yes      HDL Cholesterol: 60 mg/dL      Total Cholesterol: 161 mg/dL    Tests to Keep You Healthy    Mammogram: Met on 4/18/2023  Eye Exam: Met on 4/17/2023  Colon Cancer Screening: Met on 1/9/2023  Last Blood Pressure <= 139/89 (10/2/2023): Yes  Last HbA1c < 8 (09/28/2023): Yes  Tobacco Cessation: NO      Review of patient's allergies indicates:   Allergen Reactions    Nexium [esomeprazole magnesium] Hives    Lorabid [loracarbef] Hives    Sulfa (sulfonamide antibiotics) Hives       Current Outpatient Medications:     ALPRAZolam (XANAX) 2 MG Tab, Take 2 mg by mouth once daily., Disp: , Rfl:      amLODIPine (NORVASC) 5 MG tablet, Take 1 tablet by mouth once daily., Disp: , Rfl:     ascorbic acid, vitamin C, (VITAMIN C) 1000 MG tablet, , Disp: , Rfl:     aspirin (ECOTRIN) 81 MG EC tablet, Take 81 mg by mouth once daily., Disp: , Rfl:     blood sugar diagnostic Strp, To check BG 1 times daily, to use with insurance preferred meter, Disp: 100 strip, Rfl: 1    BYSTOLIC 5 mg Tab, Take 1 tablet by mouth once daily., Disp: , Rfl:     docusate sodium (COLACE) 100 MG capsule, , Disp: , Rfl:     famotidine (PEPCID) 20 MG tablet, Take 1 tablet (20 mg total) by mouth nightly as needed for Heartburn., Disp: 90 tablet, Rfl: 1    glipiZIDE (GLUCOTROL) 5 MG tablet, Take 0.5 tablets (2.5 mg total) by mouth daily with breakfast., Disp: 45 tablet, Rfl: 3    lancets Misc, To check BG 1 times daily, to use with insurance preferred meter, Disp: 100 each, Rfl: 1    levothyroxine (EUTHYROX) 75 MCG tablet, Take 1 tablet (75 mcg total) by mouth before breakfast., Disp: 90 tablet, Rfl: 3    metFORMIN (GLUCOPHAGE) 500 MG tablet, Take 1,000 mg by mouth 2 (two) times daily with meals., Disp: , Rfl:     nitrofurantoin, macrocrystal-monohydrate, (MACROBID) 100 MG capsule, Take 1 capsule (100 mg total) by mouth 2 (two) times daily., Disp: 10 capsule, Rfl: 0    omega-3s/dha/epa/fish oil/D3 (VITAMIN-D + OMEGA-3 ORAL), , Disp: , Rfl:     pantoprazole (PROTONIX) 20 MG tablet, Take 1 tablet (20 mg total) by mouth once daily., Disp: 90 tablet, Rfl: 3    PROAIR HFA 90 mcg/actuation inhaler, , Disp: , Rfl:     rosuvastatin (CRESTOR) 10 MG tablet, Take 10 mg by mouth once daily., Disp: , Rfl:     semaglutide (OZEMPIC) 0.25 mg or 0.5 mg(2 mg/1.5 mL) pen injector, Inject 0.5 mg into the skin every 7 days., Disp: 3 pen, Rfl: 1    zinc acetate 50 mg (zinc) Cap, , Disp: , Rfl:     zolpidem (AMBIEN) 10 mg Tab, Take 10 mg by mouth nightly as needed., Disp: , Rfl:     blood-glucose meter kit, To check BG 1 times daily, to use with insurance preferred meter,  "Disp: 1 each, Rfl: 0    tirzepatide (MOUNJARO) 2.5 mg/0.5 mL PnIj, Inject 2.5 mg into the skin every 7 days., Disp: 4 pen , Rfl: 0  No current facility-administered medications for this visit.    Facility-Administered Medications Ordered in Other Visits:     betamethasone acetate-betamethasone sodium phosphate injection, , , PRN, Ruddy Serrano MD, 3 mL at 04/12/12 1002    bupivacaine (PF) 0.25 % (2.5 mg/mL) injection, , , PRN, Ruddy Serrano MD, 3 mL at 04/12/12 1002    iopamidol 61 % intrathecal injection, , , PRNZach Michael C., MD, 3 mL at 04/12/12 1001    lidocaine (PF) 10 mg/mL (1 %) injection, , , PRZach GIBSON Michael C., MD, 10 mL at 04/12/12 0959    Review of Systems   Constitutional:  Positive for unexpected weight change (weight loss of 30 lb with Ozempic). Negative for appetite change, chills, fatigue and fever.   HENT:  Negative for ear discharge and ear pain.    Eyes:  Negative for pain, discharge and visual disturbance.   Respiratory:  Negative for apnea, cough, shortness of breath and wheezing.    Cardiovascular:  Negative for chest pain, palpitations and leg swelling.   Gastrointestinal:  Positive for reflux. Negative for abdominal pain, blood in stool, constipation, diarrhea, nausea and vomiting.   Endocrine: Negative for cold intolerance, heat intolerance and polydipsia.   Genitourinary:  Negative for bladder incontinence, dysuria, hematuria, nocturia and urgency.   Musculoskeletal:  Negative for gait problem, joint swelling and myalgias.   Neurological:  Negative for dizziness, seizures and numbness.   Psychiatric/Behavioral:  Negative for behavioral problems and hallucinations. The patient is not nervous/anxious.            Objective:      Vitals:    10/02/23 1519   BP: 126/74   Pulse: 87   Weight: 72.1 kg (159 lb)   Height: 5' 1" (1.549 m)     Physical Exam  Vitals and nursing note reviewed.   Constitutional:       General: She is not in acute distress.     Appearance: She is " well-developed. She is obese. She is not toxic-appearing.   HENT:      Head: Normocephalic and atraumatic.      Right Ear: Tympanic membrane and external ear normal. There is impacted cerumen.      Left Ear: Tympanic membrane and external ear normal. There is impacted cerumen.      Nose: Nose normal.      Mouth/Throat:      Pharynx: Oropharynx is clear.   Eyes:      Pupils: Pupils are equal, round, and reactive to light.   Neck:      Thyroid: No thyromegaly.      Vascular: No carotid bruit.   Cardiovascular:      Rate and Rhythm: Normal rate and regular rhythm.      Heart sounds: Normal heart sounds. No murmur heard.  Pulmonary:      Effort: Pulmonary effort is normal.      Breath sounds: Normal breath sounds. No wheezing or rales.   Abdominal:      General: Bowel sounds are normal. There is no distension.      Palpations: Abdomen is soft.      Tenderness: There is no abdominal tenderness.   Musculoskeletal:         General: No tenderness or deformity. Normal range of motion.      Cervical back: Normal range of motion and neck supple.      Lumbar back: Normal. No spasms.      Comments: Bends 90 degrees at  waist shoulders and knees good range of motion without crepitance.  No pitting edema to lower extremities   Lymphadenopathy:      Cervical: No cervical adenopathy.   Skin:     General: Skin is warm and dry.      Findings: No rash.   Neurological:      Mental Status: She is alert and oriented to person, place, and time. Mental status is at baseline.      Cranial Nerves: No cranial nerve deficit.      Motor: No weakness.      Coordination: Coordination normal.      Gait: Gait normal.   Psychiatric:         Mood and Affect: Mood normal.         Behavior: Behavior normal.         Thought Content: Thought content normal.         Judgment: Judgment normal.           Assessment:       1. Type 2 diabetes mellitus without complication, without long-term current use of insulin    2. Hearing loss due to cerumen impaction,  bilateral    3. Lumbosacral spondylosis without myelopathy    4. Seasonal allergic rhinitis due to pollen    5. Essential hypertension    6. Acquired hypothyroidism    7. Gastroesophageal reflux disease without esophagitis    8. Osteopenia of both hips         Plan:       Type 2 diabetes mellitus without complication, without long-term current use of insulin  -     tirzepatide (MOUNJARO) 2.5 mg/0.5 mL PnIj; Inject 2.5 mg into the skin every 7 days.  Dispense: 4 pen ; Refill: 0  Patient would like to switch from Ozempic to Mounjaro, to promote more weight loss, start at 2.5 mg weekly x4 weeks and then we will consider increasing the dose.  She may decrease her metformin to 1000 mg with supper only.  DC the glipizide  Hearing loss due to cerumen impaction, bilateral  -     Ear wax removal  Irrigate bilateral ears  Lumbosacral spondylosis without myelopathy    Seasonal allergic rhinitis due to pollen    Essential hypertension  Blood pressure well controlled, cholesterol 161  HDL 60 LDL 72.  Acquired hypothyroidism    Gastroesophageal reflux disease without esophagitis    Osteopenia of both hips      No follow-ups on file.        10/3/2023 Ruddy Fiore

## 2023-10-18 RX ORDER — TIRZEPATIDE 5 MG/.5ML
5 INJECTION, SOLUTION SUBCUTANEOUS
Qty: 4 PEN | Refills: 1 | Status: SHIPPED | OUTPATIENT
Start: 2023-10-18 | End: 2023-12-04 | Stop reason: SDUPTHER

## 2023-10-18 NOTE — TELEPHONE ENCOUNTER
----- Message from Regina Hylton sent at 10/18/2023 11:34 AM CDT -----  - 11:20-pt needs call back about monjero refills   [phone]

## 2023-10-18 NOTE — TELEPHONE ENCOUNTER
Spoke with patient who states the Mounjaro was sent to the pharmacy at 2.5 but told her to call back to get increase to 5mg if she was doing well on it.

## 2023-12-04 ENCOUNTER — TELEPHONE (OUTPATIENT)
Dept: FAMILY MEDICINE | Facility: CLINIC | Age: 73
End: 2023-12-04

## 2023-12-04 RX ORDER — TIRZEPATIDE 5 MG/.5ML
5 INJECTION, SOLUTION SUBCUTANEOUS
Qty: 4 PEN | Refills: 5 | Status: SHIPPED | OUTPATIENT
Start: 2023-12-04

## 2023-12-04 NOTE — TELEPHONE ENCOUNTER
----- Message from Regina Hylton sent at 12/4/2023  1:52 PM CST -----  Vm- 1:42-pt needs refill on monjero   [facility]   [phone]

## 2023-12-04 NOTE — TELEPHONE ENCOUNTER
----- Message from Estelle Obregon sent at 12/4/2023 10:52 AM CST -----  Contact[facility]  Refill for mounjaro 0.5 mg 2.5 mg. [facility] #[phone]

## 2024-03-27 ENCOUNTER — TELEPHONE (OUTPATIENT)
Dept: FAMILY MEDICINE | Facility: CLINIC | Age: 74
End: 2024-03-27
Payer: MEDICARE

## 2024-03-27 DIAGNOSIS — Z79.899 ENCOUNTER FOR LONG-TERM (CURRENT) USE OF OTHER MEDICATIONS: Primary | ICD-10-CM

## 2024-03-27 DIAGNOSIS — E11.9 TYPE 2 DIABETES MELLITUS WITHOUT COMPLICATION, WITHOUT LONG-TERM CURRENT USE OF INSULIN: ICD-10-CM

## 2024-03-27 DIAGNOSIS — Z78.0 MENOPAUSE: ICD-10-CM

## 2024-03-27 DIAGNOSIS — E03.9 ACQUIRED HYPOTHYROIDISM: ICD-10-CM

## 2024-03-27 DIAGNOSIS — I10 ESSENTIAL HYPERTENSION: ICD-10-CM

## 2024-03-28 ENCOUNTER — PATIENT MESSAGE (OUTPATIENT)
Dept: ADMINISTRATIVE | Facility: HOSPITAL | Age: 74
End: 2024-03-28
Payer: MEDICARE

## 2024-04-05 ENCOUNTER — PATIENT OUTREACH (OUTPATIENT)
Dept: ADMINISTRATIVE | Facility: HOSPITAL | Age: 74
End: 2024-04-05
Payer: MEDICARE

## 2024-04-05 NOTE — PROGRESS NOTES
Population Health Chart Review & Patient Outreach Details      Additional [facility] Health Notes:               Updates Requested / Reviewed:      Updated Care Coordination Note, Care Everywhere, , External Sources: [facility] and [facility], and Immunizations Reconciliation Completed or Queried: [facility] Topics Overdue:      [facility] Score: 3     Urine Screening  Hemoglobin A1c  Foot Exam    Tetanus Vaccine  RSV Vaccine                  Health Maintenance Topic(s) Outreach Outcomes & Actions Taken:    Lab(s) - Outreach Outcomes & Actions Taken  : Overdue Lab(s) Ordered

## 2024-04-08 ENCOUNTER — OFFICE VISIT (OUTPATIENT)
Dept: FAMILY MEDICINE | Facility: CLINIC | Age: 74
End: 2024-04-08
Attending: FAMILY MEDICINE
Payer: MEDICARE

## 2024-04-08 VITALS
BODY MASS INDEX: 30.21 KG/M2 | HEIGHT: 61 IN | HEART RATE: 60 BPM | WEIGHT: 160 LBS | DIASTOLIC BLOOD PRESSURE: 70 MMHG | SYSTOLIC BLOOD PRESSURE: 120 MMHG

## 2024-04-08 DIAGNOSIS — I10 ESSENTIAL HYPERTENSION: ICD-10-CM

## 2024-04-08 DIAGNOSIS — E03.9 ACQUIRED HYPOTHYROIDISM: ICD-10-CM

## 2024-04-08 DIAGNOSIS — H10.13 ALLERGIC CONJUNCTIVITIS OF BOTH EYES: ICD-10-CM

## 2024-04-08 DIAGNOSIS — M47.817 LUMBOSACRAL SPONDYLOSIS WITHOUT MYELOPATHY: ICD-10-CM

## 2024-04-08 DIAGNOSIS — E11.9 TYPE 2 DIABETES MELLITUS WITHOUT COMPLICATION, WITHOUT LONG-TERM CURRENT USE OF INSULIN: Primary | ICD-10-CM

## 2024-04-08 DIAGNOSIS — M48.02 FORAMINAL STENOSIS OF CERVICAL REGION: ICD-10-CM

## 2024-04-08 DIAGNOSIS — M75.42 ROTATOR CUFF IMPINGEMENT SYNDROME OF LEFT SHOULDER: ICD-10-CM

## 2024-04-08 DIAGNOSIS — Z12.31 OTHER SCREENING MAMMOGRAM: ICD-10-CM

## 2024-04-08 DIAGNOSIS — K21.9 GASTROESOPHAGEAL REFLUX DISEASE WITHOUT ESOPHAGITIS: ICD-10-CM

## 2024-04-08 DIAGNOSIS — J30.1 SEASONAL ALLERGIC RHINITIS DUE TO POLLEN: ICD-10-CM

## 2024-04-08 DIAGNOSIS — M85.851 OSTEOPENIA OF BOTH HIPS: ICD-10-CM

## 2024-04-08 DIAGNOSIS — M85.852 OSTEOPENIA OF BOTH HIPS: ICD-10-CM

## 2024-04-08 DIAGNOSIS — J45.20 MILD INTERMITTENT ASTHMA WITHOUT COMPLICATION: ICD-10-CM

## 2024-04-08 PROCEDURE — 1159F MED LIST DOCD IN RCRD: CPT | Mod: CPTII,S$GLB,, | Performed by: FAMILY MEDICINE

## 2024-04-08 PROCEDURE — 99214 OFFICE O/P EST MOD 30 MIN: CPT | Mod: S$GLB,,, | Performed by: FAMILY MEDICINE

## 2024-04-08 PROCEDURE — 3288F FALL RISK ASSESSMENT DOCD: CPT | Mod: CPTII,S$GLB,, | Performed by: FAMILY MEDICINE

## 2024-04-08 PROCEDURE — 3074F SYST BP LT 130 MM HG: CPT | Mod: CPTII,S$GLB,, | Performed by: FAMILY MEDICINE

## 2024-04-08 PROCEDURE — 1101F PT FALLS ASSESS-DOCD LE1/YR: CPT | Mod: CPTII,S$GLB,, | Performed by: FAMILY MEDICINE

## 2024-04-08 PROCEDURE — 3078F DIAST BP <80 MM HG: CPT | Mod: CPTII,S$GLB,, | Performed by: FAMILY MEDICINE

## 2024-04-08 RX ORDER — PREDNISOLONE ACETATE 10 MG/ML
1 SUSPENSION/ DROPS OPHTHALMIC 2 TIMES DAILY
Qty: 5 ML | Refills: 0 | Status: SHIPPED | OUTPATIENT
Start: 2024-04-08 | End: 2024-04-18

## 2024-04-08 RX ORDER — ALBUTEROL SULFATE 90 UG/1
2 AEROSOL, METERED RESPIRATORY (INHALATION) EVERY 6 HOURS PRN
Qty: 54 G | Refills: 3 | Status: SHIPPED | OUTPATIENT
Start: 2024-04-08

## 2024-04-09 NOTE — PROGRESS NOTES
SUBJECTIVE:    Patient ID: Cora Larry is a 73 y.o. female.    Chief Complaint: Follow-up (Brought Labs, no bottles,foot exam ordered, abc)    73-year-old female with diabetes mellitus.  She is able to walk 1 or 2 blocks and then has to stop and rest her hips.  After minute of rest she is able to continue walking.  Her back aches occasionally.  History of lumbosacral spondylosis without sciatica.    Diabetes type 2, on Mounjaro injections 7.5 mg weekly.  Ozempic caused too much heartburn.  She now takes Pepcid or Zantac when she does get some mild heartburn.  A1c stable at 6.2    She smokes 4 cigarettes per day.  No alcohol intake    Planning on a trip to [place] with her  next month.  They are driving there with help of their daughter.    Hypertension-blood pressure well controlled    Due for a mammogram later this year    Has allergic conjunctivitis and wants a refill on her prednisolone eyedrops for p.r.n. use    Follow-up  Pertinent negatives include no chest pain, headaches or vomiting.       No visits with results within 6 Month(s) from this visit.   Latest known visit with results is:   Telephone on 09/27/2023   Component Date Value Ref Range Status    Glucose 09/28/2023 128 (H)  70 - 99 mg/dL Final    BUN 09/28/2023 22  8 - 27 mg/dL Final    Creatinine 09/28/2023 0.71  0.57 - 1.00 mg/dL Final    eGFR 09/28/2023 89  >59 mL/min/1.73 Final    BUN/Creatinine Ratio 09/28/2023 31 (H)  12 - 28 Final    Sodium 09/28/2023 140  134 - 144 mmol/L Final    Potassium 09/28/2023 4.7  3.5 - 5.2 mmol/L Final    Chloride 09/28/2023 105  96 - 106 mmol/L Final    CO2 09/28/2023 23  20 - 29 mmol/L Final    Calcium 09/28/2023 10.0  8.7 - 10.3 mg/dL Final    Protein, Total 09/28/2023 6.4  6.0 - 8.5 g/dL Final    Albumin 09/28/2023 4.5  3.8 - 4.8 g/dL Final    Globulin, Total 09/28/2023 1.9  1.5 - 4.5 g/dL Final    Albumin/Globulin Ratio 09/28/2023 2.4 (H)  1.2 - 2.2 Final    Total Bilirubin 09/28/2023 0.3  0.0 - 1.2  mg/dL Final    Alkaline Phosphatase 09/28/2023 50  44 - 121 IU/L Final    AST 09/28/2023 16  0 - 40 IU/L Final    ALT 09/28/2023 20  0 - 32 IU/L Final    Cholesterol 09/28/2023 161  100 - 199 mg/dL Final    Triglycerides 09/28/2023 175 (H)  0 - 149 mg/dL Final    HDL 09/28/2023 60  >39 mg/dL Final    VLDL Cholesterol Ector 09/28/2023 29  5 - 40 mg/dL Final    LDL Calculated 09/28/2023 72  0 - 99 mg/dL Final    Hemoglobin A1c 09/28/2023 5.6  4.8 - 5.6 % Final       Past Medical History:   Diagnosis Date    Arthritis     Back pain     Depression     Diabetes mellitus     Hypercholesteremia     Seasonal allergies      Social History     Socioeconomic History    Marital status:    Tobacco Use    Smoking status: Some Days     Current packs/day: 0.25     Average packs/day: 0.3 packs/day for 57.0 years (14.3 ttl pk-yrs)     Types: Cigarettes    Smokeless tobacco: Never   Substance and Sexual Activity    Alcohol use: Never     Alcohol/week: 1.0 standard drink of alcohol     Types: 1 Glasses of wine per week     Comment: 2 times per month    Drug use: Never    Sexual activity: Not Currently     Partners: Male     Birth control/protection: Partner-Vasectomy     Past Surgical History:   Procedure Laterality Date    CHOLECYSTECTOMY      COSMETIC SURGERY      lucero      dec 2011    EYE SURGERY       Family History   Problem Relation Age of Onset    Breast cancer Sister     Breast cancer Maternal Aunt     Asthma Sister     Cancer Sister     Diabetes Sister     Heart disease Sister     Hyperlipidemia Sister     Hypertension Sister        The CVD Risk score (Yessenia et al., 2008) failed to calculate for the following reasons:    The 2008 CVD risk score is only valid for ages 30 to 74    Tests to Keep You Healthy    Eye Exam: Met on 10/23/2023  Colon Cancer Screening: Met on 1/9/2023  Last Blood Pressure <= 139/89 (4/8/2024): Yes  Last HbA1c < 8 (09/28/2023): Yes  Tobacco Cessation: NO      Review of patient's allergies  indicates:   Allergen Reactions    Nexium [esomeprazole magnesium] Hives    Lorabid [loracarbef] Hives    Sulfa (sulfonamide antibiotics) Hives       Current Outpatient Medications:     ALPRAZolam (XANAX) 2 MG Tab, Take 2 mg by mouth once daily., Disp: , Rfl:     amLODIPine (NORVASC) 5 MG tablet, Take 1 tablet by mouth once daily., Disp: , Rfl:     aspirin (ECOTRIN) 81 MG EC tablet, Take 81 mg by mouth once daily., Disp: , Rfl:     BYSTOLIC 5 mg Tab, Take 1 tablet by mouth once daily., Disp: , Rfl:     docusate sodium (COLACE) 100 MG capsule, , Disp: , Rfl:     famotidine (PEPCID) 20 MG tablet, Take 1 tablet (20 mg total) by mouth nightly as needed for Heartburn., Disp: 90 tablet, Rfl: 1    lancets Misc, To check BG 1 times daily, to use with insurance preferred meter, Disp: 100 each, Rfl: 1    levothyroxine (EUTHYROX) 75 MCG tablet, Take 1 tablet (75 mcg total) by mouth before breakfast., Disp: 90 tablet, Rfl: 3    metFORMIN (GLUCOPHAGE) 500 MG tablet, Take 1,000 mg by mouth 2 (two) times daily with meals., Disp: , Rfl:     omega-3s/dha/epa/fish oil/D3 (VITAMIN-D + OMEGA-3 ORAL), , Disp: , Rfl:     rosuvastatin (CRESTOR) 10 MG tablet, Take 10 mg by mouth once daily., Disp: , Rfl:     tirzepatide (MOUNJARO) 5 mg/0.5 mL PnIj, Inject 5 mg into the skin every 7 days. (Patient taking differently: Inject 5 mg into the skin every 7 days. 7.5mg), Disp: 4 Pen, Rfl: 5    zolpidem (AMBIEN) 10 mg Tab, Take 10 mg by mouth nightly as needed., Disp: , Rfl:     blood sugar diagnostic Strp, To check BG 1 times daily, to use with insurance preferred meter, Disp: 100 strip, Rfl: 1    blood-glucose meter kit, To check BG 1 times daily, to use with insurance preferred meter, Disp: 1 each, Rfl: 0    prednisoLONE acetate (PRED FORTE) 1 % DrpS, Place 1 drop into both eyes 2 (two) times daily. for 10 days, Disp: 5 mL, Rfl: 0    PROAIR HFA 90 mcg/actuation inhaler, Inhale 2 puffs into the lungs every 6 (six) hours as needed for Wheezing.,  "Disp: 54 g, Rfl: 3    zinc acetate 50 mg (zinc) Cap, , Disp: , Rfl:   No current facility-administered medications for this visit.    Facility-Administered Medications Ordered in Other Visits:     betamethasone acetate-betamethasone sodium phosphate injection, , , PRZach GIBSON Michael C., MD, 3 mL at 04/12/12 1002    bupivacaine (PF) 0.25 % (2.5 mg/mL) injection, , , Zach FALK Michael C., MD, 3 mL at 04/12/12 1002    iopamidol 61 % intrathecal injection, , , Zach FALK Michael C., MD, 3 mL at 04/12/12 1001    lidocaine (PF) 10 mg/mL (1 %) injection, , , Zach FALK Michael C., MD, 10 mL at 04/12/12 0959    Review of Systems   Constitutional:  Negative for activity change.   HENT:  Negative for hearing loss and trouble swallowing.    Eyes:  Positive for pain and itching. Negative for discharge.   Respiratory:  Negative for chest tightness and wheezing.    Cardiovascular:  Negative for chest pain and palpitations.   Gastrointestinal:  Negative for constipation, diarrhea and vomiting.   Genitourinary:  Negative for difficulty urinating and hematuria.   Musculoskeletal:  Positive for back pain and gait problem.   Neurological:  Negative for headaches.   Psychiatric/Behavioral:  Negative for dysphoric mood.            Objective:      Vitals:    04/08/24 1523   BP: 120/70   Pulse: 60   Weight: 72.6 kg (160 lb)   Height: 5' 1" (1.549 m)     Physical Exam  Vitals and nursing note reviewed.   Constitutional:       General: She is not in acute distress.     Appearance: She is well-developed. She is obese. She is not toxic-appearing.   HENT:      Head: Normocephalic and atraumatic.      Right Ear: Tympanic membrane and external ear normal.      Left Ear: Tympanic membrane and external ear normal.      Nose: Nose normal.   Eyes:      Conjunctiva/sclera:      Right eye: Right conjunctiva is injected.      Left eye: Left conjunctiva is injected.      Pupils: Pupils are equal, round, and reactive to light.   Neck:      " Thyroid: No thyromegaly.      Vascular: No carotid bruit.   Cardiovascular:      Rate and Rhythm: Normal rate and regular rhythm.      Heart sounds: Normal heart sounds. No murmur heard.  Pulmonary:      Effort: Pulmonary effort is normal.      Breath sounds: Normal breath sounds. No wheezing or rales.   Abdominal:      General: Bowel sounds are normal. There is no distension.      Palpations: Abdomen is soft.      Tenderness: There is no abdominal tenderness.   Musculoskeletal:         General: No tenderness or deformity. Normal range of motion.      Cervical back: Normal range of motion and neck supple.      Lumbar back: Normal. No spasms.      Comments: Bends 90 degrees at  waist shoulders and knees good range of motion without crepitance.  No pitting edema to lower extremities   Feet:      Right foot:      Protective Sensation: 5 sites tested.  5 sites sensed.      Skin integrity: Skin integrity normal.      Left foot:      Protective Sensation: 5 sites tested.  5 sites sensed.      Skin integrity: Skin integrity normal.   Lymphadenopathy:      Cervical: No cervical adenopathy.   Skin:     General: Skin is warm and dry.      Findings: No rash.   Neurological:      Mental Status: She is alert and oriented to person, place, and time.      Cranial Nerves: No cranial nerve deficit.      Coordination: Coordination normal.   Psychiatric:         Behavior: Behavior normal.         Thought Content: Thought content normal.         Judgment: Judgment normal.           Assessment:       1. Type 2 diabetes mellitus without complication, without long-term current use of insulin    2. Other screening mammogram    3. Allergic conjunctivitis of both eyes    4. Mild intermittent asthma without complication    5. Lumbosacral spondylosis without myelopathy    6. Foraminal stenosis of cervical region    7. Seasonal allergic rhinitis due to pollen    8. Essential hypertension    9. Acquired hypothyroidism    10. Gastroesophageal  reflux disease without esophagitis    11. Rotator cuff impingement syndrome of left shoulder    12. Osteopenia of both hips         Plan:       Type 2 diabetes mellitus without complication, without long-term current use of insulin  -     Foot Exam Performed  A1c well controlled at 6.2, continue Mounjaro injections weekly  Other screening mammogram  -     Mammo Digital Screening Bilat w/ Romulo; Future; Expected date: 04/19/2024    Allergic conjunctivitis of both eyes  -     prednisoLONE acetate (PRED FORTE) 1 % DrpS; Place 1 drop into both eyes 2 (two) times daily. for 10 days  Dispense: 5 mL; Refill: 0    Mild intermittent asthma without complication  -     PROAIR HFA 90 mcg/actuation inhaler; Inhale 2 puffs into the lungs every 6 (six) hours as needed for Wheezing.  Dispense: 54 g; Refill: 3    Lumbosacral spondylosis without myelopathy    Foraminal stenosis of cervical region    Seasonal allergic rhinitis due to pollen    Essential hypertension  Blood pressure well controlled, cholesterol 191  moderately elevated  Acquired hypothyroidism  TSH at goal  Gastroesophageal reflux disease without esophagitis  Continue Pepcid AC  Rotator cuff impingement syndrome of left shoulder    Osteopenia of both hips      Follow up in about 6 months (around 10/8/2024), or Asthma,, for Diabetic Check-Up.        4/8/2024 Ruddy Fiore

## 2024-04-10 RX ORDER — ALBUTEROL SULFATE 90 UG/1
2 AEROSOL, METERED RESPIRATORY (INHALATION) EVERY 6 HOURS PRN
Qty: 18 G | Refills: 3 | Status: SHIPPED | OUTPATIENT
Start: 2024-04-10

## 2024-04-10 NOTE — TELEPHONE ENCOUNTER
----- Message from Love Gusman sent at 4/10/2024  9:58 AM CDT -----  [facility] with [facility] [address] insurance not covering please call so that they will be able to go over alternatives for patient.   [phone]   Ref#[id number]

## 2024-04-10 NOTE — TELEPHONE ENCOUNTER
Spoke to Corry and she stated Proair inhaler is not covered and the covered inhaler is Albuterol 90mcg is covered. Okay to change?     Can you review to it dont have to be printed?

## 2024-04-29 DIAGNOSIS — E03.9 ACQUIRED HYPOTHYROIDISM: ICD-10-CM

## 2024-04-29 RX ORDER — LEVOTHYROXINE SODIUM 75 UG/1
75 TABLET ORAL
Qty: 90 TABLET | Refills: 3 | Status: SHIPPED | OUTPATIENT
Start: 2024-04-29

## 2024-04-29 NOTE — TELEPHONE ENCOUNTER
----- Message from Regina Hylton sent at 4/29/2024 12:58 PM CDT -----  - 12:40-pt needs refill on thyroid pill   [facility]   [phone]

## 2024-05-01 ENCOUNTER — HOSPITAL ENCOUNTER (OUTPATIENT)
Dept: RADIOLOGY | Facility: HOSPITAL | Age: 74
Discharge: HOME | End: 2024-05-01
Attending: FAMILY MEDICINE
Payer: MEDICARE

## 2024-05-01 DIAGNOSIS — Z12.31 OTHER SCREENING MAMMOGRAM: ICD-10-CM

## 2024-05-01 PROCEDURE — 77063 BREAST TOMOSYNTHESIS BI: CPT | Mod: 26,,, | Performed by: RADIOLOGY

## 2024-05-01 PROCEDURE — 77067 SCR MAMMO BI INCL CAD: CPT | Mod: 26,,, | Performed by: RADIOLOGY

## 2024-05-01 PROCEDURE — 77067 SCR MAMMO BI INCL CAD: CPT | Mod: TC,PO

## 2024-08-13 NOTE — TELEPHONE ENCOUNTER
----- Message from Regina Hylton sent at 1/30/2023  3:26 PM CST -----  - pt can not get refill on her ozempic and not sure what to do. Her mail order has victoza if she can get that   [phone]     Physical Therapy    Visit Type: treatment  SUBJECTIVE  Patient agreed to participate in therapy this date.  \"I don't know if I can go home and take care of myself.\"   Patient / Family Goal: maximize function    Pain   Patient denies pain.     OBJECTIVE     Cognitive Status   Level of Consciousness   - alert  Affect/Behavior    - calm, cooperative and flat  Orientation    - Oriented to: person, place, time and situation  Functional Communication   - Overall Communication Status: within functional limits   - Forms of Communication: verbal  Attention Span    - Attention: intact  Safety Awareness/Insight   - decreased awareness of need for assistance  Awareness of Deficits   - fully aware of deficits    Vitals:  Pt orthostatic with activity. Report dizziness with ambulation.     BP after initial gait trial: 90/66 (symptomatic)  BP after transferring to recliner: 80/57 (symptomatic)   BP reclined in chair: 90/61(asymptomatic)   BP after 5 minutes reclined in chair: 90/62 (asymptomatic)    Updated RN in person who is ok with pt remaining in reclined position in recliner. Educated pt to call for assist if begins experiencing dizziness. Updated provider via secure chat at end of session.     Patient Activity Tolerance: 1 to 2 activity to rest    Observation   Pt with noted swelling in RUE at site of IV. Updated RN at start of session who disconnected pts IV. At end of session elevated pts arm on pillow and updated provider via secure chat.      Standing Balance  (MARIO = base of support)  Firm Surface: Double Leg      - Static, Eyes Open       - Trial 1 details: minimal assist and with double UE support       Bed Mobility  - Supine to sit: stand by assist  HOB raised, using bed rails, requiring increased time to complete  Transfers  Assistive devices: gait belt, 2-wheeled walker  - Sit to stand: minimal assist, with verbal cues  - Stand to sit: with verbal cues, moderate assist  Min A for force generation/stability/anterior  weight shifting. Mod A for eccentric control when sitting. Verbally cued for hand placement and safe sequencing with walker throughout all transfers.     Ambulation / Gait  - Assistive device: 2-wheeled walker and gait belt  - Distance (feet unless otherwise indicated): 10, 10  - Assist Level: minimal assist  - Description: decreased giancarlo/pace, decreased step length left, decreased step length right and unsteady  Pt with slow and mildly unsteady gait. No overt LOB. Limited by dizziness (refer to vitals section for more info). Cued for safety with walker throughout.      Interventions     Training provided: bed mobility training, functional ambulation, safety training, use of assistive device, transfer training, activity tolerance, compensatory techniques and energy conservation    Skilled input: Verbal instruction/cues and tactile instruction/cues  Verbal Consent: Writer verbally educated and received verbal consent for hand placement, positioning of patient, and techniques to be performed today from patient for hand placement and palpation for techniques, clothing adjustments for techniques and therapist position for techniques as described above and how they are pertinent to the patient's plan of care.         Education:   - Present and ready to learn: patient  Education provided during session:  - Results of above outlined education: Needs reinforcement    ASSESSMENT   Progress: slow progress  Interferring components: decreased activity tolerance and medical status limitations    Discharge needs based on today's assessment:   - Current level of function: significantly below baseline level of function   - Therapy needs at discharge: therapy 5 or more times per week   - Activities of daily living (ADLs) requiring support at discharge: bed mobility, transfers and ambulation   - Instrumental activities of daily living (IADLs) requiring support at discharge: home management, shopping, community mobility,  health/medication management, meal preparation and emergency responses   - Impairments that require further therapy intervention: safety awareness, activity tolerance, balance and strength    AM-PAC  - Generalized Prior Level of Function: IND/MOD I ([facility] 22-24)       Key: MOD A=moderate assistance, IND/MOD I=independent/modified independent  - Generalized Current Level of Function     - Current Mobility Score: 17       AM-PAC Scoring Key= >21 Modified Independent; 20-21 Supervision; 18-19 Minimal assist; 13-17 Moderate assist; 9-12 Max assist; <9 Total assist      PLAN (while hospitalized)  Suggestions for next session as indicated: Progress transfers and gait distance, monitor BP throughout     PT Frequency: 3-5 x per week      PT/OT Mobility Equipment for Discharge: patient may need ww  Agreement to plan and goals: patient agrees with goals and treatment plan        GOALS  Review Date: 8/19/2024  Long Term Goals: (to be met by time of discharge from hospital)  Sit to supine: Patient will complete sit to supine modified independent.  Supine to sit: Patient will complete supine to sit modified independent.  Sit to stand: Patient will complete sit to stand transfer with least restrictive device, modified independent.   Stand to sit: Patient will complete stand to sit transfer with least restrictive device, modified independent.   Ambulation (even): Patient will ambulate on even surface for 100 feet with least restrictive device.   Documented in the chart in the following areas: Assessment/Plan.      Patient at End of Session:   Location: in chair  Safety measures: alarm system in place/re-engaged, call light within reach and equipment intact  Handoff to: nurse      Therapy procedure time and total treatment time can be found documented on the Time Entry flowsheet

## 2024-09-19 ENCOUNTER — TELEPHONE (OUTPATIENT)
Dept: FAMILY MEDICINE | Facility: CLINIC | Age: 74
End: 2024-09-19
Payer: MEDICARE

## 2024-09-19 NOTE — TELEPHONE ENCOUNTER
----- Message from Regina Hylton sent at 9/19/2024  1:40 PM CDT -----  Vm- 1:18- pt would like to know if she needs labs before her visit   [phone]

## 2024-10-09 ENCOUNTER — TELEPHONE (OUTPATIENT)
Dept: FAMILY MEDICINE | Facility: CLINIC | Age: 74
End: 2024-10-09
Payer: MEDICARE

## 2024-10-23 ENCOUNTER — OFFICE VISIT (OUTPATIENT)
Dept: FAMILY MEDICINE | Facility: CLINIC | Age: 74
End: 2024-10-23
Payer: MEDICARE

## 2024-10-23 VITALS
DIASTOLIC BLOOD PRESSURE: 66 MMHG | OXYGEN SATURATION: 98 % | WEIGHT: 162 LBS | BODY MASS INDEX: 30.58 KG/M2 | SYSTOLIC BLOOD PRESSURE: 110 MMHG | HEIGHT: 61 IN | HEART RATE: 98 BPM

## 2024-10-23 DIAGNOSIS — I10 ESSENTIAL HYPERTENSION: ICD-10-CM

## 2024-10-23 DIAGNOSIS — Z23 NEED FOR VACCINATION: ICD-10-CM

## 2024-10-23 DIAGNOSIS — K21.9 GASTROESOPHAGEAL REFLUX DISEASE WITHOUT ESOPHAGITIS: ICD-10-CM

## 2024-10-23 DIAGNOSIS — J30.1 SEASONAL ALLERGIC RHINITIS DUE TO POLLEN: ICD-10-CM

## 2024-10-23 DIAGNOSIS — M48.02 FORAMINAL STENOSIS OF CERVICAL REGION: ICD-10-CM

## 2024-10-23 DIAGNOSIS — M85.852 OSTEOPENIA OF BOTH HIPS: ICD-10-CM

## 2024-10-23 DIAGNOSIS — E11.9 TYPE 2 DIABETES MELLITUS WITHOUT COMPLICATION, WITHOUT LONG-TERM CURRENT USE OF INSULIN: Primary | ICD-10-CM

## 2024-10-23 DIAGNOSIS — M47.817 LUMBOSACRAL SPONDYLOSIS WITHOUT MYELOPATHY: ICD-10-CM

## 2024-10-23 DIAGNOSIS — F17.200 SMOKER: ICD-10-CM

## 2024-10-23 DIAGNOSIS — E03.9 ACQUIRED HYPOTHYROIDISM: ICD-10-CM

## 2024-10-23 DIAGNOSIS — M85.851 OSTEOPENIA OF BOTH HIPS: ICD-10-CM

## 2024-10-23 PROCEDURE — 3078F DIAST BP <80 MM HG: CPT | Mod: CPTII,S$GLB,, | Performed by: FAMILY MEDICINE

## 2024-10-23 PROCEDURE — 3288F FALL RISK ASSESSMENT DOCD: CPT | Mod: CPTII,S$GLB,, | Performed by: FAMILY MEDICINE

## 2024-10-23 PROCEDURE — 99214 OFFICE O/P EST MOD 30 MIN: CPT | Mod: 25,S$GLB,, | Performed by: FAMILY MEDICINE

## 2024-10-23 PROCEDURE — 3044F HG A1C LEVEL LT 7.0%: CPT | Mod: CPTII,S$GLB,, | Performed by: FAMILY MEDICINE

## 2024-10-23 PROCEDURE — 3074F SYST BP LT 130 MM HG: CPT | Mod: CPTII,S$GLB,, | Performed by: FAMILY MEDICINE

## 2024-10-23 PROCEDURE — 1101F PT FALLS ASSESS-DOCD LE1/YR: CPT | Mod: CPTII,S$GLB,, | Performed by: FAMILY MEDICINE

## 2024-10-23 PROCEDURE — G0008 ADMIN INFLUENZA VIRUS VAC: HCPCS | Mod: S$GLB,,, | Performed by: FAMILY MEDICINE

## 2024-10-23 PROCEDURE — 90653 IIV ADJUVANT VACCINE IM: CPT | Mod: S$GLB,,, | Performed by: FAMILY MEDICINE

## 2024-10-23 PROCEDURE — 1159F MED LIST DOCD IN RCRD: CPT | Mod: CPTII,S$GLB,, | Performed by: FAMILY MEDICINE

## 2024-10-23 RX ORDER — ALBUTEROL SULFATE 90 UG/1
2 INHALANT RESPIRATORY (INHALATION) EVERY 6 HOURS PRN
Qty: 54 G | Refills: 3 | Status: SHIPPED | OUTPATIENT
Start: 2024-10-23 | End: 2024-10-24

## 2024-10-24 RX ORDER — ALBUTEROL SULFATE 90 UG/1
2 INHALANT RESPIRATORY (INHALATION) EVERY 6 HOURS PRN
Qty: 54 G | Refills: 3 | Status: SHIPPED | OUTPATIENT
Start: 2024-10-24

## 2024-10-24 NOTE — PROGRESS NOTES
SUBJECTIVE:    Patient ID: Cora Larry is a 73 y.o. female.    Chief Complaint: Follow-up (Flu vaccine  ordered, face discomfort off//on for one month , review Lab-results, abc )    Follow-up  Associated symptoms include arthralgias and neck pain. Pertinent negatives include no abdominal pain, chest pain, chills, coughing, fatigue, fever, headaches, joint swelling, myalgias, nausea, numbness, vomiting or weakness.       History of Present Illness    CHIEF COMPLAINT:  Cora presents today for follow-up and medication management.    GASTROINTESTINAL ISSUES:  She reports recent constipation managed with occasional MiraLax (added to coffee) and daily magnesium supplement. She experienced GI problems during a recent 5-day vacation, attributed to MiraLax use, which have since resolved. She denies current stomach trouble or indigestion but reports heartburn, possibly related to medication.    NEUROLOGICAL SYMPTOMS:  She describes a tingling sensation on the left side of her face, characterized as a numbness or nerve-like feeling that comes and goes without pain, occurring more frequently over the past month. She denies similar sensations on the right side. She has a history of a congenital neck compression, discovered through previous imaging (possibly CT). A specialist informed her this condition might lead to symptoms such as dropping objects or falling as she ages. She denies current issues with arm function.    MEDICATIONS:  She would like her Her Mounjaro dosage increased to 10 mg. She experiences heartburn, believed to be related to Mounjaro injections, typically occurring two days post-administration and subsiding as the week progresses. She manages this with daily OTC Famotidine. She takes daily allergy medication, two blood pressure medications (including Norvasc), Xanax for sleep, and a daily baby aspirin at night. She denies alcohol consumption.    RECENT TESTS AND RESULTS:  Recent tests show  normal mammogram results, normal bone density scan from last year, and normal stress test results from late last year. Recent labs revealed good cholesterol levels with low LDL, slightly elevated triglycerides at 184, blood sugar of 135, A1C of 6.0, normal thyroid function, no anemia, and normal kidney function.    LIFESTYLE FACTORS:  She reports limited fluid intake, consuming only two cans of diet soda and a small amount of water daily, primarily when taking medications. She smokes approximately 4-5 cigarettes per day, mainly after dinner and while watching television.      ROS:  Constitutional: -appetite change, -chills, -fatigue, -fever, -unexpected weight change  HENT: -ear pain, -trouble swallowing  Eyes: -pain, -discharge, -visual disturbance  Respiratory: -apnea, -cough, -shortness of breath, -wheezing  Cardiovascular: -chest pain, -leg swelling  Gastrointestinal: -abdominal pain, -blood in stool, +constipation, -diarrhea, -nausea, -vomiting, +reflux, -indigestion  Endocrine: -cold intolerance, -heat intolerance, -polydipsia  Genitourinary: -bladder incontinence, -dysuria, -erectile dysfunction, -frequency, -hematuria, -testicular pain, -urgency, -nocturia  Musculoskeletal: -gait problem, -joint swelling, -myalgia  Neurological: -dizziness, -seizures, -numbness, +tingling  Psychiatric/Behavioral: -agitation, -hallucinations, -nervous, -anxiety symptoms         No visits with results within 6 Month(s) from this visit.   Latest known visit with results is:   Telephone on 03/27/2024   Component Date Value Ref Range Status    Glucose 10/16/2024 135 (H)  65 - 99 mg/dL Final    BUN 10/16/2024 27 (H)  7 - 25 mg/dL Final    Creatinine 10/16/2024 0.76  0.60 - 1.00 mg/dL Final    eGFR 10/16/2024 82  > OR = 60 mL/min/1.73m2 Final    BUN/Creatinine Ratio 10/16/2024 36 (H)  6 - 22 (calc) Final    Sodium 10/16/2024 139  135 - 146 mmol/L Final    Potassium 10/16/2024 4.3  3.5 - 5.3 mmol/L Final    Chloride 10/16/2024 102   98 - 110 mmol/L Final    CO2 10/16/2024 28  20 - 32 mmol/L Final    Calcium 10/16/2024 10.0  8.6 - 10.4 mg/dL Final    Total Protein 10/16/2024 6.6  6.1 - 8.1 g/dL Final    Albumin 10/16/2024 4.5  3.6 - 5.1 g/dL Final    Globulin, Total 10/16/2024 2.1  1.9 - 3.7 g/dL (calc) Final    Albumin/Globulin Ratio 10/16/2024 2.1  1.0 - 2.5 (calc) Final    Total Bilirubin 10/16/2024 0.7  0.2 - 1.2 mg/dL Final    Alkaline Phosphatase 10/16/2024 78  37 - 153 U/L Final    AST 10/16/2024 15  10 - 35 U/L Final    ALT 10/16/2024 20  6 - 29 U/L Final    Cholesterol 10/16/2024 163  <200 mg/dL Final    HDL 10/16/2024 61  > OR = 50 mg/dL Final    Triglycerides 10/16/2024 184 (H)  <150 mg/dL Final    LDL Cholesterol 10/16/2024 74  mg/dL (calc) Final    HDL/Cholesterol Ratio 10/16/2024 2.7  <5.0 (calc) Final    Non HDL Chol. (LDL+VLDL) 10/16/2024 102  <130 mg/dL (calc) Final    Hemoglobin A1C 10/16/2024 6.0 (H)  <5.7 % of total Hgb Final    WBC 10/16/2024 7.6  3.8 - 10.8 Thousand/uL Final    RBC 10/16/2024 4.69  3.80 - 5.10 Million/uL Final    Hemoglobin 10/16/2024 14.7  11.7 - 15.5 g/dL Final    Hematocrit 10/16/2024 44.2  35.0 - 45.0 % Final    MCV 10/16/2024 94.2  80.0 - 100.0 fL Final    MCH 10/16/2024 31.3  27.0 - 33.0 pg Final    MCHC 10/16/2024 33.3  32.0 - 36.0 g/dL Final    RDW 10/16/2024 12.2  11.0 - 15.0 % Final    Platelets 10/16/2024 255  140 - 400 Thousand/uL Final    MPV 10/16/2024 11.3  7.5 - 12.5 fL Final    Neutrophils, Abs 10/16/2024 3,070  1,500 - 7,800 cells/uL Final    Lymph # 10/16/2024 3,359  850 - 3,900 cells/uL Final    Mono # 10/16/2024 661  200 - 950 cells/uL Final    Eos # 10/16/2024 410  15 - 500 cells/uL Final    Baso # 10/16/2024 99  0 - 200 cells/uL Final    Neutrophils Relative 10/16/2024 40.4  % Final    Lymph % 10/16/2024 44.2  % Final    Mono % 10/16/2024 8.7  % Final    Eosinophil % 10/16/2024 5.4  % Final    Basophil % 10/16/2024 1.3  % Final    TSH w/reflex to FT4 10/16/2024 0.60  0.40 - 4.50 mIU/L  Final       Past Medical History:   Diagnosis Date    Arthritis     Back pain     Depression     Diabetes mellitus     Hypercholesteremia     Seasonal allergies      Social History     Socioeconomic History    Marital status:    Tobacco Use    Smoking status: Some Days     Current packs/day: 0.25     Average packs/day: 0.3 packs/day for 57.0 years (14.3 ttl pk-yrs)     Types: Cigarettes    Smokeless tobacco: Never   Substance and Sexual Activity    Alcohol use: Never     Alcohol/week: 1.0 standard drink of alcohol     Types: 1 Glasses of wine per week     Comment: 2 times per month    Drug use: Never    Sexual activity: Not Currently     Partners: Male     Birth control/protection: Partner-Vasectomy     Social Drivers of Health     Financial Resource Strain: Low Risk  (10/20/2024)    Overall Financial Resource Strain (CARDIA)     Difficulty of Paying Living Expenses: Not hard at all   Food Insecurity: No Food Insecurity (10/20/2024)    Hunger Vital Sign     Worried About Running Out of Food in the Last Year: Never true     Ran Out of Food in the Last Year: Never true   Physical Activity: Inactive (10/20/2024)    Exercise Vital Sign     Days of Exercise per Week: 0 days     Minutes of Exercise per Session: 0 min   Stress: Stress Concern Present (10/20/2024)    [employer] - Occupational Stress Questionnaire     Feeling of Stress : To some extent   Housing Stability: Unknown (10/20/2024)    Housing Stability Vital Sign     Unable to Pay for Housing in the Last Year: No     Past Surgical History:   Procedure Laterality Date    CHOLECYSTECTOMY      COSMETIC SURGERY      lucero      dec 2011    EYE SURGERY       Family History   Problem Relation Name Age of Onset    Breast cancer Sister      Breast cancer Maternal Aunt      Asthma Sister Lubna Conforto     Cancer Sister Lubna Conforto     Diabetes Sister Lubna Conforto     Heart disease Sister Lubna Conforto     Hyperlipidemia Sister Lubna  Lara     Hypertension Sister Lubna Bermudez        The CVD Risk score (JUAN CARLOS'Deirdre, et al., 2008) failed to calculate for the following reasons:    The 2008 CVD risk score is only valid for ages 30 to 74    Tests to Keep You Healthy    Eye Exam: Met on 4/29/2024  Colon Cancer Screening: Met on 1/9/2023  Last Blood Pressure <= 139/89 (10/23/2024): Yes  Last HbA1c < 8 (10/16/2024): Yes  Tobacco Cessation: NO      Review of patient's allergies indicates:   Allergen Reactions    Nexium [esomeprazole magnesium] Hives    Lorabid [loracarbef] Hives    Sulfa (sulfonamide antibiotics) Hives       Current Outpatient Medications:     albuterol (VENTOLIN HFA) 90 mcg/actuation inhaler, Inhale 2 puffs into the lungs every 6 (six) hours as needed for Wheezing. Rescue, Disp: 18 g, Rfl: 3    ALPRAZolam (XANAX) 2 MG Tab, Take 2 mg by mouth once daily., Disp: , Rfl:     amLODIPine (NORVASC) 5 MG tablet, Take 1 tablet by mouth once daily., Disp: , Rfl:     aspirin (ECOTRIN) 81 MG EC tablet, Take 81 mg by mouth once daily., Disp: , Rfl:     blood sugar diagnostic Strp, To check BG 1 times daily, to use with insurance preferred meter, Disp: 100 strip, Rfl: 1    BYSTOLIC 5 mg Tab, Take 1 tablet by mouth once daily., Disp: , Rfl:     docusate sodium (COLACE) 100 MG capsule, , Disp: , Rfl:     lancets Misc, To check BG 1 times daily, to use with insurance preferred meter, Disp: 100 each, Rfl: 1    levothyroxine (EUTHYROX) 75 MCG tablet, Take 1 tablet (75 mcg total) by mouth before breakfast., Disp: 90 tablet, Rfl: 3    metFORMIN (GLUCOPHAGE) 500 MG tablet, Take 1,000 mg by mouth 2 (two) times daily with meals., Disp: , Rfl:     omega-3s/dha/epa/fish oil/D3 (VITAMIN-D + OMEGA-3 ORAL), , Disp: , Rfl:     rosuvastatin (CRESTOR) 10 MG tablet, Take 10 mg by mouth once daily., Disp: , Rfl:     zinc acetate 50 mg (zinc) Cap, , Disp: , Rfl:     zolpidem (AMBIEN) 10 mg Tab, Take 10 mg by mouth nightly as needed., Disp: , Rfl:     albuterol  (VENTOLIN HFA) 90 mcg/actuation inhaler, Inhale 2 puffs into the lungs every 6 (six) hours as needed for Wheezing. Rescue, Disp: 54 g, Rfl: 3    blood-glucose meter kit, To check BG 1 times daily, to use with insurance preferred meter, Disp: 1 each, Rfl: 0    famotidine (PEPCID) 20 MG tablet, Take 1 tablet (20 mg total) by mouth nightly as needed for Heartburn., Disp: 90 tablet, Rfl: 1    tirzepatide 10 mg/0.5 mL PnIj, Inject 10 mg into the skin every 7 days., Disp: 12 Pen, Rfl: 3  No current facility-administered medications for this visit.    Facility-Administered Medications Ordered in Other Visits:     betamethasone acetate-betamethasone sodium phosphate injection, , , PRN, Ruddy Serrano MD, 3 mL at 04/12/12 1002    bupivacaine (PF) 0.25 % (2.5 mg/mL) injection, , , PRN, Ruddy Serrano MD, 3 mL at 04/12/12 1002    iopamidol 61 % intrathecal injection, , , PRN, Ruddy Serrano MD, 3 mL at 04/12/12 1001    lidocaine (PF) 10 mg/mL (1 %) injection, , , PRN, Ruddy Serrano MD, 10 mL at 04/12/12 0959    Review of Systems   Constitutional:  Negative for activity change, appetite change, chills, fatigue, fever and unexpected weight change.   HENT:  Positive for hearing loss. Negative for ear discharge, ear pain, rhinorrhea and trouble swallowing.    Eyes:  Negative for pain, discharge and visual disturbance.   Respiratory:  Negative for apnea, cough, chest tightness, shortness of breath and wheezing.    Cardiovascular:  Negative for chest pain, palpitations and leg swelling.   Gastrointestinal:  Negative for abdominal pain, blood in stool, constipation, diarrhea, nausea, vomiting and reflux.   Endocrine: Negative for cold intolerance, heat intolerance, polydipsia and polyuria.   Genitourinary:  Negative for bladder incontinence, difficulty urinating, dysuria, hematuria, menstrual problem, nocturia and urgency.   Musculoskeletal:  Positive for arthralgias and neck pain. Negative for gait problem,  "joint swelling and myalgias.   Neurological:  Negative for dizziness, seizures, weakness, numbness and headaches.   Psychiatric/Behavioral:  Negative for behavioral problems, confusion, dysphoric mood and hallucinations. The patient is not nervous/anxious.            Objective:      Vitals:    10/23/24 1516   BP: 110/66   Pulse: 98   SpO2: 98%   Weight: 73.5 kg (162 lb)   Height: 5' 1" (1.549 m)     Physical Exam  Vitals and nursing note reviewed.   Constitutional:       General: She is not in acute distress.     Appearance: Normal appearance. She is well-developed. She is not toxic-appearing.   HENT:      Head: Normocephalic and atraumatic.      Right Ear: Tympanic membrane and external ear normal.      Left Ear: Tympanic membrane and external ear normal.      Nose: Nose normal.      Mouth/Throat:      Pharynx: Oropharynx is clear. No posterior oropharyngeal erythema.   Eyes:      Pupils: Pupils are equal, round, and reactive to light.   Neck:      Thyroid: No thyromegaly.      Vascular: No carotid bruit.   Cardiovascular:      Rate and Rhythm: Normal rate and regular rhythm.      Heart sounds: Normal heart sounds. No murmur heard.  Pulmonary:      Effort: Pulmonary effort is normal.      Breath sounds: Normal breath sounds. No wheezing or rales.   Abdominal:      General: Bowel sounds are normal. There is no distension.      Palpations: Abdomen is soft.      Tenderness: There is no abdominal tenderness.   Musculoskeletal:         General: No tenderness or deformity. Normal range of motion.      Cervical back: Normal range of motion and neck supple.      Lumbar back: Normal. No spasms.      Comments: Bends 90 degrees at  waist, shoulders and knees have full range of motion, no pitting edema to lower extremities   Lymphadenopathy:      Cervical: No cervical adenopathy.   Skin:     General: Skin is warm and dry.      Findings: No rash.   Neurological:      General: No focal deficit present.      Mental Status: She is " alert and oriented to person, place, and time. Mental status is at baseline.      Cranial Nerves: No cranial nerve deficit.      Coordination: Coordination normal.   Psychiatric:         Mood and Affect: Mood normal.         Behavior: Behavior normal.         Thought Content: Thought content normal.         Judgment: Judgment normal.       Physical Exam    HENT: Oropharynx is clear. No posterior oropharyngeal erythema. Tiny bit of cerumen in ear.  Cardiovascular: Irregular heartbeat. Normal heart sounds.  Pulmonary: Normal breath sounds.  Musculoskeletal: Good joint mobility in knees. Good flexibility.  Skin: Dry skin.  Extremities: Presence of varicose veins.             Assessment:       1. Type 2 diabetes mellitus without complication, without long-term current use of insulin    2. Need for vaccination    3. Smoker    4. Lumbosacral spondylosis without myelopathy    5. Foraminal stenosis of cervical region    6. Seasonal allergic rhinitis due to pollen    7. Essential hypertension    8. Acquired hypothyroidism    9. Gastroesophageal reflux disease without esophagitis    10. Osteopenia of both hips         Plan:       Type 2 diabetes mellitus without complication, without long-term current use of insulin  -     tirzepatide 10 mg/0.5 mL PnIj; Inject 10 mg into the skin every 7 days.  Dispense: 12 Pen; Refill: 3  A1c is down to 6.0.  We will increase Mounjaro to 10 mg weekly.  This will help promote weight loss as well.  Need for vaccination  -     influenza (adjuvanted) (Fluad) 45 mcg/0.5 mL IM vaccine (> or = 64 yo) 0.5 mL  Flu vaccine today  Smoker  -     albuterol (VENTOLIN HFA) 90 mcg/actuation inhaler; Inhale 2 puffs into the lungs every 6 (six) hours as needed for Wheezing. Rescue  Dispense: 54 g; Refill: 3  -     X-Ray Chest PA And Lateral; Future; Expected date: 10/23/2024  Surveillance chest x-ray ordered, she is a light smoker.  Lumbosacral spondylosis without myelopathy  Managing her back issues  conservative  Foraminal stenosis of cervical region  This may be causing the tingling of her left face  Seasonal allergic rhinitis due to pollen  Continue antihistamines Zyrtec as needed  Essential hypertension  Blood pressure well controlled  Acquired hypothyroidism  TSH at goal  Gastroesophageal reflux disease without esophagitis  Pepcid AC as needed  Osteopenia of both hips  DEXA scan shows normal bone density at this time    Assessment & Plan    Assessed constipation and current management with MiraLAX and magnesium  Evaluated tingling sensation in left side of face, potentially related to previously diagnosed neck compression  Reviewed recent normal mammogram and bone density results  Considered increasing Mounjaro dosage due to weight loss plateau and mild weight gain  Assessed heartburn symptoms, potentially related to Mounjaro injections  Reviewed recent lab results, noting slightly elevated triglycerides but overall good cholesterol, blood sugar, and kidney function  Evaluated current smoking habits and COPD concerns    NERVE-RELATED TINGLING:  - Explained that tingling sensation is likely nerve-related, potentially stemming from the neck or back.    HEARTBURN:  - Discussed the relationship between Mounjaro injections and temporary heartburn symptoms.  - Continued OTC Pepcid or Famotidine daily for heartburn.    KIDNEY HEALTH:  - Emphasized the importance of adequate fluid intake for kidney health.  - Cora to increase fluid intake beyond current consumption of 2 diet sodas per day.    CONSTIPATION:  - Cora to continue using MiraLAX as needed for constipation management.    DIABETES:  - Increased Mounjaro to 10 mg injection weekly.    HYPERTENSION:  - Continued Norvasc for blood pressure management.    CARDIOVASCULAR HEALTH:  - Continued daily baby aspirin for heart health.    INSOMNIA:  - Continued Xanax for sleep aid.    ALLERGIES:  - Continued OTC Zyrtec daily for allergy  maintenance.    RESPIRATORY ISSUES:  - Started Ventolin (ProAir) inhaler as needed for breathing difficulties.  - Chest XR ordered for both patient and spouse to be completed at [facility] within the next 2 months.    FOLLOW UP:  - Follow up in 6 months.         Follow up in about 6 months (around 4/23/2025) for Diabetic Check-Up.        This note was generated with the assistance of ambient listening technology. Verbal consent was obtained by the patient and accompanying visitor(s) for the recording of patient appointment to facilitate this note. I attest to having reviewed and edited the generated note for accuracy, though some syntax or spelling errors may persist. Please contact the author of this note for any clarification.      10/23/2024 Ruddy Fiore

## 2024-10-24 NOTE — TELEPHONE ENCOUNTER
----- Message from Regina sent at 10/24/2024 12:24 PM CDT -----  [facility] is calling about her ventolin hfa is not covered. They will call albuterol pro air hfa   [phone] ref# [id number]

## 2024-10-28 ENCOUNTER — HOSPITAL ENCOUNTER (OUTPATIENT)
Dept: RADIOLOGY | Facility: HOSPITAL | Age: 74
Discharge: HOME | End: 2024-10-28
Attending: FAMILY MEDICINE
Payer: MEDICARE

## 2024-10-28 ENCOUNTER — TELEPHONE (OUTPATIENT)
Dept: FAMILY MEDICINE | Facility: CLINIC | Age: 74
End: 2024-10-28
Payer: MEDICARE

## 2024-10-28 DIAGNOSIS — F17.200 SMOKER: ICD-10-CM

## 2024-10-28 LAB
LEFT EYE DM RETINOPATHY: POSITIVE
RIGHT EYE DM RETINOPATHY: POSITIVE

## 2024-10-28 PROCEDURE — 71046 X-RAY EXAM CHEST 2 VIEWS: CPT | Mod: 26,,, | Performed by: RADIOLOGY

## 2024-10-28 PROCEDURE — 71046 X-RAY EXAM CHEST 2 VIEWS: CPT | Mod: TC,PO

## 2024-10-29 ENCOUNTER — PATIENT OUTREACH (OUTPATIENT)
Dept: ADMINISTRATIVE | Facility: HOSPITAL | Age: 74
End: 2024-10-29
Payer: MEDICARE

## 2024-11-04 ENCOUNTER — TELEPHONE (OUTPATIENT)
Dept: FAMILY MEDICINE | Facility: CLINIC | Age: 74
End: 2024-11-04
Payer: MEDICARE

## 2024-11-04 NOTE — TELEPHONE ENCOUNTER
Spoke with pt in regards to recent lab results. Verbalized verbatim per Dr. Fiore. Pt acknowledged understanding.

## 2024-11-04 NOTE — TELEPHONE ENCOUNTER
----- Message from Ruddy Fiore MD sent at 11/2/2024 12:42 PM CDT -----  Call patient.  Her chest x-ray was clear.  No sign of lung disease or tumors.

## 2025-02-21 DIAGNOSIS — Z00.00 ENCOUNTER FOR MEDICARE ANNUAL WELLNESS EXAM: ICD-10-CM

## 2025-02-24 NOTE — TELEPHONE ENCOUNTER
Please take your medications as instructed.   Take your lisinopril once daily. Take hydrochlorothiazide as needed if pressures going above 140/90. Take blood pressure cuff with you.   Please obtain your pap once you get to [place].   Refills sent to your pharmacy.       If you were prescribed a medication, please read your medication instructions carefully. Any common side effects or adverse effects may be listed on your prescription when you receive it. If you have any questions or concerns, please do not hesitate to reach our office for further information.     Please call 911 or go to the Emergency Room immediately if you are experiencing any worsening or worrisome symptoms.     Thank you for choosing [facility] for your health care needs.    Morelia Ascencio PA-C, MMS, DMSc, MPH    Additional Educational Resources:  For additional resources regarding your symptoms, diagnosis, or further health information, please visit the Health Resources section on [email or web address] or the Education section in your [employer] patient portal.      As a reminder: a heart healthy diet and exercise program (20 mins of walking 5 times a week) is recommended.     For questions about the [employer] south, call [phone] or email [email or web address]  For questions about your existing [employer] portal, call [phone]       Spoke with pt in regards to recent lab results. Verbalized per Alex that pt's Cologuard results. Pt acknowledge understanding.

## 2025-03-12 RX ORDER — METFORMIN HYDROCHLORIDE 500 MG/1
1000 TABLET ORAL 2 TIMES DAILY WITH MEALS
Qty: 360 TABLET | Refills: 2 | Status: SHIPPED | OUTPATIENT
Start: 2025-03-12

## 2025-03-12 NOTE — TELEPHONE ENCOUNTER
----- Message from Regina sent at 3/12/2025  3:36 PM CDT -----  -3:05- pt needs refill on metformin [phone]

## 2025-04-09 ENCOUNTER — TELEPHONE (OUTPATIENT)
Dept: FAMILY MEDICINE | Facility: CLINIC | Age: 75
End: 2025-04-09
Payer: MEDICARE

## 2025-04-09 DIAGNOSIS — Z78.0 MENOPAUSE: ICD-10-CM

## 2025-04-09 DIAGNOSIS — E11.9 TYPE 2 DIABETES MELLITUS WITHOUT COMPLICATION, WITHOUT LONG-TERM CURRENT USE OF INSULIN: ICD-10-CM

## 2025-04-09 DIAGNOSIS — Z79.899 ENCOUNTER FOR LONG-TERM (CURRENT) USE OF MEDICATIONS: Primary | ICD-10-CM

## 2025-04-09 DIAGNOSIS — E03.9 ACQUIRED HYPOTHYROIDISM: ICD-10-CM

## 2025-04-09 DIAGNOSIS — I10 ESSENTIAL HYPERTENSION: ICD-10-CM

## 2025-04-30 ENCOUNTER — OFFICE VISIT (OUTPATIENT)
Dept: FAMILY MEDICINE | Facility: CLINIC | Age: 75
End: 2025-04-30
Payer: MEDICARE

## 2025-04-30 VITALS
WEIGHT: 165.81 LBS | SYSTOLIC BLOOD PRESSURE: 110 MMHG | HEART RATE: 85 BPM | HEIGHT: 61 IN | OXYGEN SATURATION: 95 % | DIASTOLIC BLOOD PRESSURE: 70 MMHG | BODY MASS INDEX: 31.3 KG/M2

## 2025-04-30 DIAGNOSIS — Z13.820 SCREENING FOR OSTEOPOROSIS: ICD-10-CM

## 2025-04-30 DIAGNOSIS — M47.817 LUMBOSACRAL SPONDYLOSIS WITHOUT MYELOPATHY: ICD-10-CM

## 2025-04-30 DIAGNOSIS — I10 ESSENTIAL HYPERTENSION: ICD-10-CM

## 2025-04-30 DIAGNOSIS — Z78.0 MENOPAUSE: ICD-10-CM

## 2025-04-30 DIAGNOSIS — M75.42 ROTATOR CUFF IMPINGEMENT SYNDROME OF LEFT SHOULDER: ICD-10-CM

## 2025-04-30 DIAGNOSIS — M48.02 FORAMINAL STENOSIS OF CERVICAL REGION: ICD-10-CM

## 2025-04-30 DIAGNOSIS — K21.9 GASTROESOPHAGEAL REFLUX DISEASE WITHOUT ESOPHAGITIS: ICD-10-CM

## 2025-04-30 DIAGNOSIS — E03.9 ACQUIRED HYPOTHYROIDISM: ICD-10-CM

## 2025-04-30 DIAGNOSIS — E11.9 TYPE 2 DIABETES MELLITUS WITHOUT COMPLICATION, WITHOUT LONG-TERM CURRENT USE OF INSULIN: ICD-10-CM

## 2025-04-30 DIAGNOSIS — Z12.31 ENCOUNTER FOR SCREENING MAMMOGRAM FOR BREAST CANCER: ICD-10-CM

## 2025-04-30 DIAGNOSIS — E11.3293 TYPE 2 DIABETES MELLITUS WITH BOTH EYES AFFECTED BY MILD NONPROLIFERATIVE RETINOPATHY WITHOUT MACULAR EDEMA, WITHOUT LONG-TERM CURRENT USE OF INSULIN: Primary | ICD-10-CM

## 2025-04-30 PROCEDURE — 1101F PT FALLS ASSESS-DOCD LE1/YR: CPT | Mod: CPTII,S$GLB,, | Performed by: FAMILY MEDICINE

## 2025-04-30 PROCEDURE — 3288F FALL RISK ASSESSMENT DOCD: CPT | Mod: CPTII,S$GLB,, | Performed by: FAMILY MEDICINE

## 2025-04-30 PROCEDURE — 3074F SYST BP LT 130 MM HG: CPT | Mod: CPTII,S$GLB,, | Performed by: FAMILY MEDICINE

## 2025-04-30 PROCEDURE — 1159F MED LIST DOCD IN RCRD: CPT | Mod: CPTII,S$GLB,, | Performed by: FAMILY MEDICINE

## 2025-04-30 PROCEDURE — 1126F AMNT PAIN NOTED NONE PRSNT: CPT | Mod: CPTII,S$GLB,, | Performed by: FAMILY MEDICINE

## 2025-04-30 PROCEDURE — 3078F DIAST BP <80 MM HG: CPT | Mod: CPTII,S$GLB,, | Performed by: FAMILY MEDICINE

## 2025-04-30 PROCEDURE — 99214 OFFICE O/P EST MOD 30 MIN: CPT | Mod: S$GLB,,, | Performed by: FAMILY MEDICINE

## 2025-04-30 RX ORDER — FAMOTIDINE 40 MG/1
40 TABLET, FILM COATED ORAL 2 TIMES DAILY
Qty: 180 TABLET | Refills: 3 | Status: SHIPPED | OUTPATIENT
Start: 2025-04-30 | End: 2026-04-30

## 2025-04-30 RX ORDER — ORAL SEMAGLUTIDE 7 MG/1
7 TABLET ORAL DAILY
Qty: 30 TABLET | Refills: 5 | Status: SHIPPED | OUTPATIENT
Start: 2025-04-30 | End: 2025-05-06 | Stop reason: SDUPTHER

## 2025-04-30 RX ORDER — LEVOTHYROXINE SODIUM 75 UG/1
75 TABLET ORAL
Qty: 90 TABLET | Refills: 3 | Status: SHIPPED | OUTPATIENT
Start: 2025-04-30 | End: 2025-05-08 | Stop reason: SDUPTHER

## 2025-05-04 PROBLEM — E11.3293 TYPE 2 DIABETES MELLITUS WITH BOTH EYES AFFECTED BY MILD NONPROLIFERATIVE RETINOPATHY WITHOUT MACULAR EDEMA, WITHOUT LONG-TERM CURRENT USE OF INSULIN: Status: ACTIVE | Noted: 2025-05-04

## 2025-05-04 NOTE — PROGRESS NOTES
"  SUBJECTIVE:    Patient ID: Cora Larry is a 74 y.o. female.    Chief Complaint: Diabetes (No bottles//Pt is here for a check up//Pt would like to discuss heart burn medication and GLP medication. Pt stated that she stop taken the Mounjaro about 8 weeks ago, b/c of her heart burn. She stated that she took Ozempic 1 mg injection this week//Mammo and dexa scan ordered today//JESSY )    Diabetes  Pertinent negatives for hypoglycemia include no confusion or headaches. Pertinent negatives for diabetes include no chest pain, no polydipsia, no polyuria and no weakness.       History of Present Illness    CHIEF COMPLAINT:  Cora presents today for follow up of diabetes and medication management.    DIABETES:  She reports A1C of 6.5 and fasting blood sugar of 140+. She denies requiring finger sticks or insulin for management. She has previously used GLP-1 medications including Wegovy, which resulted in weight loss from 190 to 160 lbs. She subsequently tried Mounjaro and Ozempic but discontinued due to severe heartburn.    GERD:  She experiences severe heartburn that wakes her up at night while on GLP-1 medications, describing it as feeling like she was "having a heart attack." She denies heartburn when not on GLP-1 medications. Current Pantoprazole is not providing relief. She reports some relief with baking soda mixed with water. She has never had an upper GI endoscopy. She has a history of hives with Nexium.    GI:  She denies constipation or diarrhea. She uses Miralax as needed for regular bowel movements.    HAIR LOSS:  She is currently seeing a dermatologist and has started minoxidil 2.5. She expresses significant concern about hair thinning and fears further progression over the next 10 years.    CURRENT MEDICATIONS:  She continues long-term levothyroxine for thyroid management.      ROS:  Constitutional: -appetite change, -chills, -fatigue, -fever, -unexpected weight change  HENT: -ear pain, -trouble " swallowing  Eyes: -pain, -discharge, -visual disturbance  Respiratory: -apnea, -cough, -shortness of breath, -wheezing  Cardiovascular: -chest pain, -leg swelling  Gastrointestinal: -abdominal pain, -blood in stool, -constipation, -diarrhea, -nausea, -vomiting, +reflux  Endocrine: -cold intolerance, -heat intolerance, -polydipsia  Genitourinary: -bladder incontinence, -dysuria, -erectile dysfunction, -frequency, -hematuria, -testicular pain, -urgency, -nocturia  Musculoskeletal: -gait problem, -joint swelling, -myalgia  Neurological: -dizziness, -seizures, -numbness  Psychiatric/Behavioral: -agitation, -hallucinations, -nervous, -anxiety symptoms  Skin: +abnormal hair loss         No visits with results within 6 Month(s) from this visit.   Latest known visit with results is:   Patient Outreach on 10/29/2024   Component Date Value Ref Range Status    Left Eye DM Retinopathy 10/28/2024 Positive   Final    Right Eye DM Retinopathy 10/28/2024 Positive   Final       Past Medical History:   Diagnosis Date    Arthritis     Back pain     Depression     Diabetes mellitus     Hypercholesteremia     Seasonal allergies      Social History[1]  Past Surgical History:   Procedure Laterality Date    CHOLECYSTECTOMY      COSMETIC SURGERY      lucero      dec 2011    EYE SURGERY       Family History   Problem Relation Name Age of Onset    Breast cancer Sister      Breast cancer Maternal Aunt      Asthma Sister Lubna Conforto     Cancer Sister Lubna Conforto     Diabetes Sister Lubna Conforto     Heart disease Sister Lubna Conforto     Hyperlipidemia Sister Lubna Conforto     Hypertension Sister Lubna Conforto        The CVD Risk score (ROYAAgoino, et al., 2008) failed to calculate for the following reasons:    The 2008 CVD risk score is only valid for ages 30 to 74    Tests to Keep You Healthy    Eye Exam: Met on 10/28/2024  Last Blood Pressure <= 139/89 (4/30/2025): Yes  Tobacco Cessation: NO      Review of patient's allergies indicates:  "  Allergen Reactions    Nexium [esomeprazole magnesium] Hives    Lorabid [loracarbef] Hives    Sulfa (sulfonamide antibiotics) Hives     Current Medications[2]    Review of Systems   Constitutional:  Negative for activity change and unexpected weight change.   HENT:  Negative for hearing loss, rhinorrhea and trouble swallowing.    Eyes:  Negative for discharge and visual disturbance.   Respiratory:  Negative for chest tightness and wheezing.    Cardiovascular:  Negative for chest pain and palpitations.   Gastrointestinal:  Negative for blood in stool, constipation, diarrhea and vomiting.   Endocrine: Negative for polydipsia and polyuria.   Genitourinary:  Negative for difficulty urinating, dysuria, hematuria and menstrual problem.   Musculoskeletal:  Negative for arthralgias, joint swelling and neck pain.   Integumentary:         Thinning hair on the scalp   Neurological:  Negative for weakness and headaches.   Psychiatric/Behavioral:  Negative for confusion and dysphoric mood.            Objective:      Vitals:    04/30/25 1514   BP: 110/70   Pulse: 85   SpO2: 95%   Weight: 75.2 kg (165 lb 12.8 oz)   Height: 5' 1" (1.549 m)     Physical Exam  Vitals and nursing note reviewed.   Constitutional:       General: She is not in acute distress.     Appearance: Normal appearance. She is well-developed. She is obese. She is not toxic-appearing.   HENT:      Head: Normocephalic and atraumatic.      Right Ear: Tympanic membrane and external ear normal.      Left Ear: Tympanic membrane and external ear normal.      Nose: Nose normal.      Mouth/Throat:      Pharynx: Oropharynx is clear. No posterior oropharyngeal erythema.   Eyes:      Pupils: Pupils are equal, round, and reactive to light.   Neck:      Thyroid: No thyromegaly.      Vascular: No carotid bruit.   Cardiovascular:      Rate and Rhythm: Normal rate and regular rhythm.      Heart sounds: Normal heart sounds. No murmur heard.  Pulmonary:      Effort: Pulmonary " effort is normal.      Breath sounds: Normal breath sounds. No wheezing or rales.   Abdominal:      General: Bowel sounds are normal. There is no distension.      Palpations: Abdomen is soft.      Tenderness: There is no abdominal tenderness.   Musculoskeletal:         General: No tenderness or deformity. Normal range of motion.      Cervical back: Normal range of motion and neck supple.      Lumbar back: Normal. No spasms.      Comments: Bends 90 degrees at  waist, shoulders and knees have full range of motion, no pitting edema to lower extremities   Lymphadenopathy:      Cervical: No cervical adenopathy.   Skin:     General: Skin is warm and dry.      Findings: No rash.      Comments: Hair on the scalp is thinning   Neurological:      General: No focal deficit present.      Mental Status: She is alert and oriented to person, place, and time. Mental status is at baseline.      Cranial Nerves: No cranial nerve deficit.      Coordination: Coordination normal.   Psychiatric:         Mood and Affect: Mood normal.         Behavior: Behavior normal.         Thought Content: Thought content normal.         Judgment: Judgment normal.       Physical Exam    HENT: Tympanic membranes normal bilaterally. Oropharynx is clear.  Cardiovascular: Normal heart sounds.  Pulmonary: Dry crackles in lungs. Old scar tissue in lungs. Tiny bit of fluid in lungs.  Skin: Blood thinning bruising present.  Extremities: Varicose veins present.             Assessment:       1. Type 2 diabetes mellitus with both eyes affected by mild nonproliferative retinopathy without macular edema, without long-term current use of insulin    2. Encounter for screening mammogram for breast cancer    3. Screening for osteoporosis    4. Menopause    5. Type 2 diabetes mellitus without complication, without long-term current use of insulin    6. Gastroesophageal reflux disease without esophagitis    7. Acquired hypothyroidism    8. Lumbosacral spondylosis without  myelopathy    9. Foraminal stenosis of cervical region    10. Essential hypertension    11. Rotator cuff impingement syndrome of left shoulder         Plan:       Type 2 diabetes mellitus with both eyes affected by mild nonproliferative retinopathy without macular edema, without long-term current use of insulin  A1c 6.5, would like to try Rybelsus 7 mg daily for diabetic control and weight loss  Encounter for screening mammogram for breast cancer  -     Mammo Digital Screening Bilat; Future; Expected date: 05/02/2025  Mammogram ordered  Screening for osteoporosis  -     DXA Bone Density Appendicular Skeleton; Future; Expected date: 04/30/2025  DEXA scan due  Menopause  -     DXA Bone Density Appendicular Skeleton; Future; Expected date: 04/30/2025    Type 2 diabetes mellitus without complication, without long-term current use of insulin  -     semaglutide (RYBELSUS) 7 mg tablet; Take 1 tablet (7 mg total) by mouth once daily.  Dispense: 30 tablet; Refill: 5  -     Microalbumin/Creatinine Ratio, Urine; Future; Expected date: 04/30/2025  -     Urinalysis, Reflex to Urine Culture Urine, Clean Catch; Future; Expected date: 04/30/2025    Gastroesophageal reflux disease without esophagitis  -     famotidine (PEPCID) 40 MG tablet; Take 1 tablet (40 mg total) by mouth 2 (two) times daily.  Dispense: 180 tablet; Refill: 3  Trial of famotidine 40 mg b.i.d. for reflux symptoms  Acquired hypothyroidism  -     levothyroxine (EUTHYROX) 75 MCG tablet; Take 1 tablet (75 mcg total) by mouth before breakfast.  Dispense: 90 tablet; Refill: 3  Continue levothyroxine  Lumbosacral spondylosis without myelopathy  Stable at this time  Foraminal stenosis of cervical region    Essential hypertension  Blood pressure doing well with current regimen cholesterol 177 triglycerides 185 HDL 65 LDL 81-followed by Dr. Villa  Rotator cuff impingement syndrome of left shoulder  Not much pain in the left shoulder    Assessment & Plan    E11.3487  Type 2 diabetes mellitus with mild nonproliferative diabetic retinopathy without macular edema, bilateral  K21.9 Gastro-esophageal reflux disease without esophagitis  Z88.8 Allergy status to other drugs, medicaments and biological substances  L65.9 Nonscarring hair loss, unspecified  R91.8 Other nonspecific abnormal finding of lung field  R09.89 Other specified symptoms and signs involving the circulatory and respiratory systems  I83.93 Asymptomatic varicose veins of bilateral lower extremities  R23.3 Spontaneous ecchymoses    IMPRESSION:  - Considered minoxidil 2.5 for hair loss, noting its mild effect and potential benefit for BP, explaining it can take several months to show results.  - Evaluated thyroid medication needs, confirming Levothyroxine 75 mcg as current prescription.  - Assessed GLP-1 receptor agonist options for diabetes management, considering history of significant heartburn with Ozempic and Mounjaro, explaining that GLP-1 receptor agonists can slow stomach emptying, potentially exacerbating reflux symptoms.  - Reviewed A1C of 6.5, noting it as relatively well-controlled compared to higher levels seen in family members.  - Considered upper GI endoscopy to investigate potential hiatal hernia as cause of significant reflux symptoms.  - Evaluated current antacid regimen, determining need for stronger acid suppression.  - Assessed overall health status, noting well-controlled cholesterol and thyroid levels.    TYPE 2 DIABETES MELLITUS WITH MILD NONPROLIFERATIVE DIABETIC RETINOPATHY WITHOUT MACULAR EDEMA, BILATERAL:  - Rolan diabetes is currently well-controlled with A1C of 6.5, though this represents an increase from previous values.  - Fasting glucose was approximately 140.  - Rolan weight has increased from 160 to the 160s after initial weight loss from 190 to 160 on Wegovy.  - Due to the rising glucose levels, treatment adjustment is necessary despite relatively good control compared to  patient's siblings.  - Prescribed Rybelsus (oral GLP-1 receptor agonist similar to injectable Ozempic) with instructions to take daily with morning medications or every other day if too strong.  - Discontinued Ozempic and Mounjaro.  - No finger sticks or insulin injections required at this time.    GASTRO-ESOPHAGEAL REFLUX DISEASE:  - Cora experiences severe heartburn that wakes them up at night with symptoms mimicking a heart attack, persisting even after 14 hours of fasting.  - Evaluated that delayed gastric emptying due to Ozempic and Mounjaro is causing the reflux symptoms.  - These medications have been discontinued.  - Prescribed Famotidine 40 mg twice daily for symptom management.  - Considering possibility of hiatal hernia; upper GI endoscopy recommended for further investigation.    DRUG ALLERGY:  - Cora reports urticaria when taking Nexium.  - Famotidine prescribed as an alternative, which also helps mitigate urticaria symptoms.    HAIR LOSS:  - Cora reports significant hair thinning and has consulted with a dermatologist who prescribed minoxidil 2.5% for alopecia treatment.  - Results expected in approximately 2 months.    LUNG ABNORMALITIES:  - Detected dry crackles, old scar tissue, and a small amount of fluid in the patient's lungs during physical exam.    VARICOSE VEINS:  - Observed varicose veins in the patient's lower extremities during physical exam.    ECCHYMOSES:  - Observed blood thinning-related ecchymoses on the patient's body during exam.         Follow up in about 6 months (around 10/30/2025) for Diabetic Check-Up.        This note was generated with the assistance of ambient listening technology. Verbal consent was obtained by the patient and accompanying visitor(s) for the recording of patient appointment to facilitate this note. I attest to having reviewed and edited the generated note for accuracy, though some syntax or spelling errors may persist. Please contact the  author of this note for any clarification.      5/4/2025 Ruddy Fiore           [1]   Social History  Socioeconomic History    Marital status:    Tobacco Use    Smoking status: Some Days     Current packs/day: 0.25     Average packs/day: 0.3 packs/day for 57.0 years (14.3 ttl pk-yrs)     Types: Cigarettes    Smokeless tobacco: Never   Substance and Sexual Activity    Alcohol use: Never     Alcohol/week: 1.0 standard drink of alcohol     Types: 1 Glasses of wine per week     Comment: 2 times per month    Drug use: Never    Sexual activity: Not Currently     Partners: Male     Birth control/protection: Partner-Vasectomy     Social Drivers of Health     Financial Resource Strain: Low Risk  (4/28/2025)    Overall Financial Resource Strain (CARDIA)     Difficulty of Paying Living Expenses: Not hard at all   Food Insecurity: No Food Insecurity (4/28/2025)    Hunger Vital Sign     Worried About Running Out of Food in the Last Year: Never true     Ran Out of Food in the Last Year: Never true   Transportation Needs: No Transportation Needs (4/28/2025)    PRAPARE - Transportation     Lack of Transportation (Medical): No     Lack of Transportation (Non-Medical): No   Physical Activity: Inactive (4/28/2025)    Exercise Vital Sign     Days of Exercise per Week: 0 days     Minutes of Exercise per Session: 0 min   Stress: Stress Concern Present (10/20/2024)    [employer] - Occupational Stress Questionnaire     Feeling of Stress : To some extent   Housing Stability: Low Risk  (4/28/2025)    Housing Stability Vital Sign     Unable to Pay for Housing in the Last Year: No     Homeless in the Last Year: No   [2]   Current Outpatient Medications:     albuterol (PROAIR HFA) 90 mcg/actuation inhaler, Inhale 2 puffs into the lungs every 6 (six) hours as needed for Wheezing. Rescue, Disp: 54 g, Rfl: 3    ALPRAZolam (XANAX) 2 MG Tab, Take 2 mg by mouth once daily., Disp: , Rfl:     amLODIPine (NORVASC)  5 MG tablet, Take 1 tablet by mouth once daily., Disp: , Rfl:     aspirin (ECOTRIN) 81 MG EC tablet, Take 81 mg by mouth once daily., Disp: , Rfl:     blood sugar diagnostic Strp, To check BG 1 times daily, to use with insurance preferred meter, Disp: 100 strip, Rfl: 1    blood-glucose meter kit, To check BG 1 times daily, to use with insurance preferred meter, Disp: 1 each, Rfl: 0    BYSTOLIC 5 mg Tab, Take 1 tablet by mouth once daily., Disp: , Rfl:     docusate sodium (COLACE) 100 MG capsule, , Disp: , Rfl:     famotidine (PEPCID) 40 MG tablet, Take 1 tablet (40 mg total) by mouth 2 (two) times daily., Disp: 180 tablet, Rfl: 3    lancets Misc, To check BG 1 times daily, to use with insurance preferred meter, Disp: 100 each, Rfl: 1    levothyroxine (EUTHYROX) 75 MCG tablet, Take 1 tablet (75 mcg total) by mouth before breakfast., Disp: 90 tablet, Rfl: 3    metFORMIN (GLUCOPHAGE) 500 MG tablet, Take 2 tablets (1,000 mg total) by mouth 2 (two) times daily with meals., Disp: 360 tablet, Rfl: 2    minoxidiL (LONITEN) 2.5 MG tablet, 1/4 po qd x 7d then 1/2 po qd x 7d, then 1 po qd as tolerated, Disp: 30 tablet, Rfl: 11    omega-3s/dha/epa/fish oil/D3 (VITAMIN-D + OMEGA-3 ORAL), , Disp: , Rfl:     pantoprazole (PROTONIX) 20 MG tablet, Take 20 mg by mouth daily as needed., Disp: , Rfl:     rosuvastatin (CRESTOR) 10 MG tablet, Take 10 mg by mouth once daily., Disp: , Rfl:     semaglutide (RYBELSUS) 7 mg tablet, Take 1 tablet (7 mg total) by mouth once daily., Disp: 30 tablet, Rfl: 5    tirzepatide 10 mg/0.5 mL PnIj, Inject 10 mg into the skin every 7 days., Disp: 12 Pen, Rfl: 3    zinc acetate 50 mg (zinc) Cap, , Disp: , Rfl:     zolpidem (AMBIEN) 10 mg Tab, Take 10 mg by mouth nightly as needed., Disp: , Rfl:   No current facility-administered medications for this visit.    Facility-Administered Medications Ordered in Other Visits:     betamethasone acetate-betamethasone sodium phosphate injection, , , PRN, Zach  Ruddy RUSSELL MD, 3 mL at 04/12/12 1002    bupivacaine (PF) 0.25 % (2.5 mg/mL) injection, , , PRN, Ruddy Serrano MD, 3 mL at 04/12/12 1002    iopamidol 61 % intrathecal injection, , , PRN, Ruddy Serrano MD, 3 mL at 04/12/12 1001    lidocaine (PF) 10 mg/mL (1 %) injection, , , PRN, Ruddy Serrano MD, 10 mL at 04/12/12 0959

## 2025-05-05 ENCOUNTER — HOSPITAL ENCOUNTER (OUTPATIENT)
Dept: RADIOLOGY | Facility: HOSPITAL | Age: 75
Discharge: HOME | End: 2025-05-05
Attending: FAMILY MEDICINE
Payer: MEDICARE

## 2025-05-05 DIAGNOSIS — Z12.31 ENCOUNTER FOR SCREENING MAMMOGRAM FOR BREAST CANCER: ICD-10-CM

## 2025-05-05 DIAGNOSIS — Z78.0 MENOPAUSE: ICD-10-CM

## 2025-05-05 DIAGNOSIS — Z13.820 SCREENING FOR OSTEOPOROSIS: ICD-10-CM

## 2025-05-05 PROCEDURE — 77080 DXA BONE DENSITY AXIAL: CPT | Mod: TC,PO

## 2025-05-05 PROCEDURE — 77063 BREAST TOMOSYNTHESIS BI: CPT | Mod: TC,PO

## 2025-05-05 PROCEDURE — 77067 SCR MAMMO BI INCL CAD: CPT | Mod: 26,,, | Performed by: RADIOLOGY

## 2025-05-05 PROCEDURE — 77063 BREAST TOMOSYNTHESIS BI: CPT | Mod: 26,,, | Performed by: RADIOLOGY

## 2025-05-05 PROCEDURE — 77080 DXA BONE DENSITY AXIAL: CPT | Mod: 26,,, | Performed by: RADIOLOGY

## 2025-05-06 DIAGNOSIS — E11.9 TYPE 2 DIABETES MELLITUS WITHOUT COMPLICATION, WITHOUT LONG-TERM CURRENT USE OF INSULIN: ICD-10-CM

## 2025-05-06 RX ORDER — ORAL SEMAGLUTIDE 7 MG/1
7 TABLET ORAL DAILY
Qty: 90 TABLET | Refills: 1 | Status: SHIPPED | OUTPATIENT
Start: 2025-05-06

## 2025-05-06 NOTE — TELEPHONE ENCOUNTER
----- Message from Regina sent at 5/6/2025 12:53 PM CDT -----  - 12:40- [facility] needs more info on martha [phone]

## 2025-05-08 ENCOUNTER — TELEPHONE (OUTPATIENT)
Dept: FAMILY MEDICINE | Facility: CLINIC | Age: 75
End: 2025-05-08
Payer: MEDICARE

## 2025-05-08 DIAGNOSIS — E11.9 TYPE 2 DIABETES MELLITUS WITHOUT COMPLICATION, WITHOUT LONG-TERM CURRENT USE OF INSULIN: ICD-10-CM

## 2025-05-08 DIAGNOSIS — E03.9 ACQUIRED HYPOTHYROIDISM: ICD-10-CM

## 2025-05-08 RX ORDER — LEVOTHYROXINE SODIUM 75 UG/1
75 TABLET ORAL
Qty: 90 TABLET | Refills: 3 | Status: SHIPPED | OUTPATIENT
Start: 2025-05-08

## 2025-05-08 NOTE — TELEPHONE ENCOUNTER
----- Message from Regina sent at 5/8/2025 10:42 AM CDT -----  - 10:37- pt is calling back about her medication that needs to be sent to another pharmacy [phone]

## 2025-05-08 NOTE — TELEPHONE ENCOUNTER
----- Message from Regina sent at 5/8/2025  9:12 AM CDT -----  - 8:47-[facility] does not have the euthyroix tab, please send to [facility] [phone]

## 2025-05-11 ENCOUNTER — RESULTS FOLLOW-UP (OUTPATIENT)
Dept: FAMILY MEDICINE | Facility: CLINIC | Age: 75
End: 2025-05-11

## 2025-05-11 NOTE — PROGRESS NOTES
Call patient.  DEXA scan shows osteopenia of the bones.  Treatment includes taking calcium plus D vitamins twice a day.  Recheck DEXA scan in 2 years

## 2025-05-12 DIAGNOSIS — E03.9 ACQUIRED HYPOTHYROIDISM: ICD-10-CM

## 2025-05-12 RX ORDER — CHOLECALCIFEROL (VITAMIN D3) 25 MCG
1000 TABLET ORAL 2 TIMES DAILY
COMMUNITY

## 2025-05-12 RX ORDER — PSYLLIUM HUSK 0.4 G
600 CAPSULE ORAL 2 TIMES DAILY WITH MEALS
COMMUNITY

## 2025-05-12 RX ORDER — LEVOTHYROXINE SODIUM 75 UG/1
TABLET ORAL
Refills: 0 | OUTPATIENT
Start: 2025-05-12

## 2025-05-13 NOTE — TELEPHONE ENCOUNTER
Refill Decision Note   Cora Larry  is requesting a refill authorization.  Brief Assessment and Rationale for Refill:  Quick Discontinue     Medication Therapy Plan:    Pharmacy is requesting new scripts for the following medications without required information, (sig/ frequency/qty/etc)      Medication Reconciliation Completed: No     Comments: Pharmacies have been requesting medications for patients without required information, (sig, frequency, qty, etc.). In addition, requests are sent for medication(s) pt. are currently not taking, and medications patients have never taken.    We have spoken to the pharmacies about these request types and advised their teams previously that we are unable to assess these New Script requests and require all details for these requests. This is a known issue and has been reported.     Note composed:9:51 PM 05/12/2025

## 2025-08-12 DIAGNOSIS — E03.9 ACQUIRED HYPOTHYROIDISM: ICD-10-CM

## 2025-08-12 RX ORDER — LEVOTHYROXINE SODIUM 75 UG/1
TABLET ORAL
Refills: 0 | OUTPATIENT
Start: 2025-08-12